# Patient Record
Sex: MALE | Race: WHITE | NOT HISPANIC OR LATINO | Employment: FULL TIME | URBAN - NONMETROPOLITAN AREA
[De-identification: names, ages, dates, MRNs, and addresses within clinical notes are randomized per-mention and may not be internally consistent; named-entity substitution may affect disease eponyms.]

---

## 2017-02-10 ENCOUNTER — OFFICE VISIT (OUTPATIENT)
Dept: FAMILY MEDICINE | Facility: OTHER | Age: 32
End: 2017-02-10
Attending: FAMILY MEDICINE
Payer: COMMERCIAL

## 2017-02-10 VITALS
HEIGHT: 71 IN | BODY MASS INDEX: 24.5 KG/M2 | HEART RATE: 80 BPM | DIASTOLIC BLOOD PRESSURE: 72 MMHG | WEIGHT: 175 LBS | TEMPERATURE: 97.2 F | OXYGEN SATURATION: 95 % | RESPIRATION RATE: 17 BRPM | SYSTOLIC BLOOD PRESSURE: 122 MMHG

## 2017-02-10 DIAGNOSIS — F17.229 CHEWING TOBACCO NICOTINE DEPENDENCE WITH NICOTINE-INDUCED DISORDER: ICD-10-CM

## 2017-02-10 DIAGNOSIS — J02.0 STREPTOCOCCAL SORE THROAT: Primary | ICD-10-CM

## 2017-02-10 LAB
BASOPHILS # BLD AUTO: 0 10E9/L (ref 0–0.2)
BASOPHILS NFR BLD AUTO: 0.1 %
DIFFERENTIAL METHOD BLD: ABNORMAL
EOSINOPHIL # BLD AUTO: 0.1 10E9/L (ref 0–0.7)
EOSINOPHIL NFR BLD AUTO: 0.9 %
ERYTHROCYTE [DISTWIDTH] IN BLOOD BY AUTOMATED COUNT: 11.9 % (ref 10–15)
HCT VFR BLD AUTO: 42.5 % (ref 40–53)
HGB BLD-MCNC: 14.7 G/DL (ref 13.3–17.7)
IMM GRANULOCYTES # BLD: 0.1 10E9/L (ref 0–0.4)
IMM GRANULOCYTES NFR BLD: 0.5 %
LYMPHOCYTES # BLD AUTO: 2.2 10E9/L (ref 0.8–5.3)
LYMPHOCYTES NFR BLD AUTO: 14.8 %
MCH RBC QN AUTO: 27.9 PG (ref 26.5–33)
MCHC RBC AUTO-ENTMCNC: 34.6 G/DL (ref 31.5–36.5)
MCV RBC AUTO: 81 FL (ref 78–100)
MONOCYTES # BLD AUTO: 0.8 10E9/L (ref 0–1.3)
MONOCYTES NFR BLD AUTO: 5.5 %
NEUTROPHILS # BLD AUTO: 11.7 10E9/L (ref 1.6–8.3)
NEUTROPHILS NFR BLD AUTO: 78.2 %
NRBC # BLD AUTO: 0 10*3/UL
NRBC BLD AUTO-RTO: 0 /100
PLATELET # BLD AUTO: 286 10E9/L (ref 150–450)
RBC # BLD AUTO: 5.26 10E12/L (ref 4.4–5.9)
WBC # BLD AUTO: 15 10E9/L (ref 4–11)

## 2017-02-10 PROCEDURE — 36415 COLL VENOUS BLD VENIPUNCTURE: CPT | Performed by: FAMILY MEDICINE

## 2017-02-10 PROCEDURE — 85025 COMPLETE CBC W/AUTO DIFF WBC: CPT | Performed by: FAMILY MEDICINE

## 2017-02-10 PROCEDURE — 99202 OFFICE O/P NEW SF 15 MIN: CPT | Performed by: FAMILY MEDICINE

## 2017-02-10 RX ORDER — AMOXICILLIN 875 MG
875 TABLET ORAL 2 TIMES DAILY
Qty: 20 TABLET | Refills: 0 | Status: SHIPPED | OUTPATIENT
Start: 2017-02-10 | End: 2019-10-13

## 2017-02-10 ASSESSMENT — PAIN SCALES - GENERAL: PAINLEVEL: SEVERE PAIN (7)

## 2017-02-10 NOTE — MR AVS SNAPSHOT
"              After Visit Summary   2/10/2017    Ej Gustafson    MRN: [id number]           Patient Information     Date Of Birth          1985        Visit Information        Provider Department      2/10/2017 2:45 PM Mandy Carvalho MD [facility]        Today's Diagnoses     Streptococcal sore throat    -  1     Chewing tobacco nicotine dependence with nicotine-induced disorder           Care Instructions    Rest, fluids, and decongestants        Follow-ups after your visit        Who to contact     If you have questions or need follow up information about today's clinic visit or your schedule please contact [facility] directly at [phone].  Normal or non-critical lab and imaging results will be communicated to you by [employer], letter or phone within 4 business days after the clinic has received the results. If you do not hear from us within 7 days, please contact the clinic through [employer] or phone. If you have a critical or abnormal lab result, we will notify you by phone as soon as possible.  Submit refill requests through [employer] or call your pharmacy and they will forward the refill request to us. Please allow 3 business days for your refill to be completed.          Additional Information About Your Visit        MyChart Information     [employer] lets you send messages to your doctor, view your test results, renew your prescriptions, schedule appointments and more. To sign up, go to [email or web address] . Click on \"Log in\" on the left side of the screen, which will take you to the Welcome page. Then click on \"Sign up Now\" on the right side of the page.     You will be asked to enter the access code listed below, as well as some personal information. Please follow the directions to create your username and password.     Your access code is: [email or web address]  Expires: 2017  3:04 PM     Your access code will  in 90 days. If you need help or a new code, please call " "your [place] clinic or [phone].        Care EveryWhere ID     This is your Care EveryWhere ID. This could be used by other organizations to access your [place] medical records  [id number]        Your Vitals Were     Pulse Temperature Respirations Height BMI (Body Mass Index) Pulse Oximetry    80 97.2  F (36.2  C) 17 5' 10.5\" (1.791 m) 24.75 kg/m2 95%       Blood Pressure from Last 3 Encounters:   02/10/17 122/72    Weight from Last 3 Encounters:   02/10/17 175 lb (79.379 kg)              We Performed the Following     CBC with platelets and differential          Today's Medication Changes          These changes are accurate as of: 2/10/17  3:04 PM.  If you have any questions, ask your nurse or doctor.               Start taking these medicines.        Dose/Directions    amoxicillin 875 MG tablet   Commonly known as:  AMOXIL   Used for:  Streptococcal sore throat   Started by:  Mandy Carvalho MD        Dose:  875 mg   Take 1 tablet (875 mg) by mouth 2 times daily   Quantity:  20 tablet   Refills:  0            Where to get your medicines      These medications were sent to [facility] [place] - [address]  [address] [place] [address]     Phone:  [phone]    - amoxicillin 875 MG tablet             Primary Care Provider    None Specified       No primary provider on file.        Thank you!     Thank you for choosing [facility]  for your care. Our goal is always to provide you with excellent care. Hearing back from our patients is one way we can continue to improve our services. Please take a few minutes to complete the written survey that you may receive in the mail after your visit with us. Thank you!             Your Updated Medication List - Protect others around you: Learn how to safely use, store and throw away your medicines at www.disposemymeds.org.          This list is accurate as of: 2/10/17  3:04 PM.  Always use your most recent med list.       "             Brand Name Dispense Instructions for use    amoxicillin 875 MG tablet    AMOXIL    20 tablet    Take 1 tablet (875 mg) by mouth 2 times daily

## 2017-02-10 NOTE — NURSING NOTE
"Chief Complaint   Patient presents with     Pharyngitis       Initial /72 mmHg  Pulse 80  Temp(Src) 97.2  F (36.2  C)  Resp 17  Ht 5' 10.5\" (1.791 m)  Wt 175 lb (79.379 kg)  BMI 24.75 kg/m2  SpO2 95% Estimated body mass index is 24.75 kg/(m^2) as calculated from the following:    Height as of this encounter: 5' 10.5\" (1.791 m).    Weight as of this encounter: 175 lb (79.379 kg).  Medication Reconciliation: complete  "

## 2017-02-10 NOTE — PROGRESS NOTES
"  SUBJECTIVE:                                                    Ej Gustafson is a 31 year old male who presents to clinic today for the following health issues:      RESPIRATORY SYMPTOMS      Duration: Monday     Description  rhinorrhea, sore throat, cough, fatigue/malaise and myalgias    Severity: mild    Accompanying signs and symptoms: None    History (predisposing factors):  none    Precipitating or alleviating factors: None    Therapies tried and outcome:  dayquil and vitamin c     [facility]    Problem list and histories reviewed & adjusted, as indicated.  Additional history: as documented    Current Outpatient Prescriptions   Medication Sig Dispense Refill     amoxicillin (AMOXIL) 875 MG tablet Take 1 tablet (875 mg) by mouth 2 times daily 20 tablet 0     Problem list, Medication list, Allergies, and Medical/Social/Surgical histories reviewed in EPIC and updated as appropriate.    ROS:  Constitutional, HEENT, cardiovascular, pulmonary, gi and gu systems are negative, except as otherwise noted.    OBJECTIVE:                                                    /72 mmHg  Pulse 80  Temp(Src) 97.2  F (36.2  C)  Resp 17  Ht 5' 10.5\" (1.791 m)  Wt 175 lb (79.379 kg)  BMI 24.75 kg/m2  SpO2 95%  Body mass index is 24.75 kg/(m^2).  GENERAL APPEARANCE: healthy, alert, no distress and fatigued  HENT: ear canals and TM's normal, nose and mouth without ulcers or lesions and tonsillar erythema  NECK: no asymmetry, masses, or scars, thyroid normal to palpation and cervical adenopathy   RESP: lungs clear to auscultation - no rales, rhonchi or wheezes  CV: regular rates and rhythm, normal S1 S2, no S3 or S4 and no murmur, click or rub  PSYCH: mentation appears normal and affect normal/bright       ASSESSMENT/PLAN:                                                    1. Streptococcal sore throat  Discussed probiotics  - CBC with platelets and differential  - amoxicillin (AMOXIL) 875 MG tablet; " Take 1 tablet (875 mg) by mouth 2 times daily  Dispense: 20 tablet; Refill: 0    2. Chewing tobacco nicotine dependence with nicotine-induced disorder  Recommend cessation, he is actively working on it    Patient was agreeable to this plan and had no further questions.  See Patient Instructions    Mandy Carvalho MD  [facility]

## 2019-01-14 ENCOUNTER — HOSPITAL ENCOUNTER (OUTPATIENT)
Dept: PHYSICAL THERAPY | Facility: HOSPITAL | Age: 34
Setting detail: THERAPIES SERIES
Discharge: HOME | End: 2019-01-14
Payer: COMMERCIAL

## 2019-01-14 PROCEDURE — 97110 THERAPEUTIC EXERCISES: CPT | Mod: GP | Performed by: PHYSICAL THERAPIST

## 2019-01-14 PROCEDURE — 97162 PT EVAL MOD COMPLEX 30 MIN: CPT | Mod: GP | Performed by: PHYSICAL THERAPIST

## 2019-01-14 ASSESSMENT — ACTIVITIES OF DAILY LIVING (ADL)
HOS_ADL_ITEM_SCORE_TOTAL: 36
HOW_WOULD_YOU_RATE_YOUR_CURRENT_LEVEL_OF_FUNCTION_DURING_YOUR_USUAL_ACTIVITIES_OF_DAILY_LIVING_FROM_0_TO_100_WITH_100_BEING_YOUR_LEVEL_OF_FUNCTION_PRIOR_TO_YOUR_HIP_PROBLEM_AND_0_BEING_THE_INABILITY_TO_PERFORM_ANY_OF_YOUR_USUAL_DAILY_ACTIVITIES?: 90
GOING_UP_1_FLIGHT_OF_STAIRS: MODERATE DIFFICULTY
WALKING_15_MINUTES_OR_GREATER: MODERATE DIFFICULTY
GETTING_INTO_AND_OUT_OF_AN_AVERAGE_CAR: MODERATE DIFFICULTY
WALKING_UP_STEEP_HILLS: MODERATE DIFFICULTY
HOS_ADL_SCORE(%): 56.25
HOS_ADL_HIGHEST_POTENTIAL_SCORE: 64
HOS_ADL_COUNT: 16
TWISTING/PIVOTING_ON_INVOLVED_LEG: MODERATE DIFFICULTY
ROLLING_OVER_IN_BED: NO DIFFICULTY AT ALL
HEAVY_WORK: MODERATE DIFFICULTY
STEPPING_UP_AND_DOWN_CURBS: SLIGHT DIFFICULTY
WALKING_APPROXIMATELY_10_MINUTES: MODERATE DIFFICULTY
WALKING_DOWN_STEEP_HILLS: MODERATE DIFFICULTY
SITTING_FOR_15_MINUTES: MODERATE DIFFICULTY
DEEP_SQUATTING: MODERATE DIFFICULTY
LIGHT_TO_MODERATE_WORK: MODERATE DIFFICULTY
RECREATIONAL_ACTIVITIES: MODERATE DIFFICULTY
PUTTING_ON_SOCKS_AND_SHOES: MODERATE DIFFICULTY
WALKING_INITIALLY: MODERATE DIFFICULTY
STANDING_FOR_15_MINUTES: SLIGHT DIFFICULTY
GOING_DOWN_1_FLIGHT_OF_STAIRS: MODERATE DIFFICULTY

## 2019-01-15 NOTE — PROGRESS NOTES
" 01/14/19 0829   General Information   Type of Visit Initial OP Ortho PT Evaluation   Start of Care Date 01/14/19   Referring Physician Alvaro Angeles   Patient/Family Goals Statement decrease hip pain and avoid surgery   Orders Evaluate and Treat   Date of Order 11/28/18   Insurance Type Other   Ins[facility] Comments/[facility]its [facility]   Medical Diagnosis pain in left hip   Surgical/Medical history reviewed Other (see commments)  (reviewed verbally through pt, PMH not available in chart)   Body Part(s)   Body Part(s) Hip   Presentation and Etiology   Pertinent history of current problem (include personal factors and/or comorbidities that impact the POC) Pt states symptoms started approximately 1 year.  Pt states his left hip catches and locks, states he has to pop it to get it back to feeling \"normal\".  Pt states if he's sitting he can wiggle his leg back and forth and hear/feel a cracking sounds as well as if he is on his side and lifts his leg up.  Pt does not recall any specific injury to his hip.  Pt states he has had an x-ray and was told he had in impingement.  Pt states the pain doesn't necessarily wake him up at night.  Pt states he is a  at [employer] and that is bothersome, also states when he was running equipment and sitting for prolonged periods he had increased pain.  Pt also noted that this fall when he was bird hunting and on uneven surfaces he was having increased pain.  Pt states he is able to manage all activities and deals with the pain.  Pt was in [employer] until December 2009.  Pt states he has pain with ascending stairs, also noted when lifting leg to step over objects he develops increased pain.   Impairments A. Pain;D. Decreased ROM;F. Decreased strength and endurance;E. Decreased flexibility;H. Impaired gait;M. Locking or catching   Functional Limitations perform activities of daily living;perform required work activities;perform desired leisure / " sports activities   Symptom Location left hip   How/Where did it occur From insidious onset   Onset date of current episode/exacerbation (reports it started about 1 year ago)   Chronicity New   Pain rating (0-10 point scale) Best (/10);Worst (/10)   Best (/10) 1   Worst (/10) 6   Pain quality A. Sharp;C. Aching   Frequency of pain/symptoms A. Constant   Pain/symptoms are: Worse in the morning   Pain/symptoms exacerbated by A. Sitting;B. Walking;G. Certain positions;I. Bending;J. ADL;K. Home tasks;L. Work tasks   Pain/symptoms eased by K. Other;E. Changing positions;C. Rest   Pain eased by comment stretching   Progression of symptoms since onset: Unchanged   Prior Level of Function   Prior Level of Function-Mobility indpendent   Prior Level of Function-ADLs independent   Current Level of Function   Patient role/employment history A. Employed   Employment Comments    Living environment House/townhome   Home/community accessibility stairs in his home   Fall Risk Screen   Fall screen completed by PT   Have you fallen 2 or more times in the past year? No   Have you fallen and had an injury in the past year? No   Is patient a fall risk? No   Hip Objective Findings   Scour Test positive with noted going from hip ER to hip IR with pressure   NEFTALI Test positive   FADIR Test positive   Left Hip Flexion PROM WNL   Left Hip Abduction PROM tightness present bilaterally   Left Hip ER PROM 20   Left Hip IR PROM 18 with pain reported   Left Hip Flexion Strength 4+/5 with pain   Left Hip Abduction Strength 4/5 with mild pain   Left Knee Flexion Strength 4/5   Left Knee Extension Strength 5/5   Senthil Flexibility Test tightness present left hip flexor   Left Hamstring Flexibility WNL   Left Piriformis Flexibility tightness present   Side (if bilateral, select both right and left) Left   Observation no acute distress   Posture unremarkable   Balance/Proprioception (Single Leg Stance) WNL   Hip ROM Comments Pt has  increased tightness in left hip compared to right   Palpation Tenderness present in anterior hip region   Planned Therapy Interventions   Planned Therapy Interventions gait training;joint mobilization;manual therapy;neuromuscular re-education;ROM;strengthening;stretching   Clinical Impression   Criteria for Skilled Therapeutic Interventions Met yes, treatment indicated   PT Diagnosis left hip pain consistent with impingement   Influenced by the following impairments pain, decreased ROM, decreased strength   Functional limitations due to impairments decreased tolerance for work duties, decreased tolerance for prolonged sitting, decreased tolerance for walking on uneven surfaces   Clinical Presentation Evolving/Changing   Clinical Presentation Rationale clinical judgement   Clinical Decision Making (Complexity) Moderate complexity   Therapy Frequency other (see comments)  (1-2x/week prn)   Predicted Duration of Therapy Intervention (days/wks) 10 weeks   Risk & Benefits of therapy have been explained Yes   Patient, Family & other staff in agreement with plan of care Yes   Clinical Impression Comments Pt presents with left hip pain that started approximately 1 year ago from insidious onset.  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  Pt states he will get a catching/locking sensation with certain positions.  Pt has pain after prolonged sitting.  Pt has not been evaluated by orthopedics.  Pt's pain appears consistent with some form of impingement.  Pt would benefit from further evaluation by orthopedics and ongoing PT for stretching and strengthening.   Education Assessment   Preferred Learning Style Listening;Reading   Barriers to Learning No barriers   ORTHO GOALS   PT Ortho Eval Goals 1;2;3   Ortho Goal 1   Goal Identifier STG 1   Goal Description Pt to be independent and compliant with HEP.   Target Date 01/28/19   Ortho Goal 2   Goal  Identifier LTG 1   Goal Description Pt to tolerate car ride >90 minutes without increased pain upon rising from car.   Target Date 03/25/19   Ortho Goal 3   Goal Identifier LTG 2   Goal Description Pt will report at least a 50% decrease in overall pain and episodes of locking/catching to improve overall quality of life.   Target Date 03/25/19   Total Evaluation Time   PT Eval, Moderate Complexity Minutes (39993) 27

## 2019-04-27 NOTE — PROGRESS NOTES
Outpatient Physical Therapy Discharge Note     Patient: Ej Gustafson  : 1985    Beginning/End Dates of Reporting Period:  2019 to 2019    Referring Provider: Alvaro Gonzales Diagnosis: left hip pain consistent with impingement     Client Self Report: See PT eval    Objective Measurements:        Outcome Measures (most recent score):      Goals:  Goal Identifier STG 1   Goal Description Pt to be independent and compliant with HEP.   Target Date 19   Date Met      Progress:     Goal Identifier LTG 1   Goal Description Pt to tolerate car ride >90 minutes without increased pain upon rising from car.   Target Date 19   Date Met      Progress:     Goal Identifier LTG 2   Goal Description Pt will report at least a 50% decrease in overall pain and episodes of locking/catching to improve overall quality of life.   Target Date 19   Date Met      Progress:       Progress Toward Goals:   Not assessed:  Plan was for patient to follow back up after orthopedic assessment however pt failed to schedule additional treatment sessions          Plan:  Discharge from therapy.    Discharge:    Reason for Discharge: Patient has failed to schedule further appointments.    Equipment Issued:     Discharge Plan: Other services: pt was to follow up with orthopedics.

## 2019-10-13 ENCOUNTER — HOSPITAL ENCOUNTER (EMERGENCY)
Facility: HOSPITAL | Age: 34
Discharge: HOME | End: 2019-10-13
Attending: NURSE PRACTITIONER | Admitting: NURSE PRACTITIONER
Payer: COMMERCIAL

## 2019-10-13 VITALS
TEMPERATURE: 97.8 F | HEART RATE: 75 BPM | SYSTOLIC BLOOD PRESSURE: 128 MMHG | OXYGEN SATURATION: 96 % | DIASTOLIC BLOOD PRESSURE: 64 MMHG | RESPIRATION RATE: 18 BRPM

## 2019-10-13 DIAGNOSIS — L25.9 CONTACT DERMATITIS, UNSPECIFIED CONTACT DERMATITIS TYPE, UNSPECIFIED TRIGGER: Primary | ICD-10-CM

## 2019-10-13 PROCEDURE — 99213 OFFICE O/P EST LOW 20 MIN: CPT | Mod: Z6 | Performed by: NURSE PRACTITIONER

## 2019-10-13 PROCEDURE — G0463 HOSPITAL OUTPT CLINIC VISIT: HCPCS

## 2019-10-13 RX ORDER — TRIAMCINOLONE ACETONIDE 5 MG/G
CREAM TOPICAL 2 TIMES DAILY
Qty: 15 G | Refills: 1 | Status: SHIPPED | OUTPATIENT
Start: 2019-10-13 | End: 2019-12-31

## 2019-10-13 RX ORDER — METHYLPREDNISOLONE 4 MG
TABLET, DOSE PACK ORAL
Qty: 21 TABLET | Refills: 0 | Status: SHIPPED | OUTPATIENT
Start: 2019-10-13 | End: 2019-12-31

## 2019-10-13 ASSESSMENT — ENCOUNTER SYMPTOMS
FATIGUE: 0
ACTIVITY CHANGE: 0
MYALGIAS: 0
APPETITE CHANGE: 0
ARTHRALGIAS: 0
COUGH: 0
FEVER: 0
SORE THROAT: 0
CHILLS: 0
RHINORRHEA: 0

## 2019-10-13 NOTE — ED AVS SNAPSHOT
[facility]  [address] [address]  Phone:  [phone]                                    Ej Gustafson   MRN: [id number]    Department:  [facility]   Date of Visit:  10/13/2019           After Visit Summary Signature Page    I have received my discharge instructions, and my questions have been answered. I have discussed any challenges I see with this plan with the nurse or doctor.    ..........................................................................................................................................  Patient/Patient Representative Signature      ..........................................................................................................................................  Patient Representative Print Name and Relationship to Patient    ..................................................               ................................................  Date                                   Time    ..........................................................................................................................................  Reviewed by Signature/Title    ...................................................              ..............................................  Date                                               Time          22EPIC Rev 08/18

## 2019-10-13 NOTE — DISCHARGE INSTRUCTIONS
(L25.9) Contact dermatitis, unspecified contact dermatitis type, unspecified trigger  (primary encounter diagnosis)  Comment: Acute, symptomatic  Plan: Suspecting possible poison ivy.   Recommend washing clothing, bedding  Start medrol dose pack as directed  You can apply triamcinolone topically as directed    If no improvement or worsening symptoms return for further evaluation.     Sherley Pittman, CNP

## 2019-10-13 NOTE — ED PROVIDER NOTES
"  History     Chief Complaint   Patient presents with     Rash     \" rash since Oct 3\"     HPI  Ej Gustafson is a 34 year old male who presents ambulatory with concerns of rash. Started around October 3, 2019. He is uncertain of possible cause. Denies fever, chills, otalgia, pharyngitis, rhinorrhea, coughing. Denies any new exposures. The rash is itchy - he has been using cortisone cream with minimal relief. Rash started on right arm and he now has some areas on RLE, LLE, and LUE. Rash does not burn or feel painful. Prior to onset of rash he had spent a lot of time out in the woods bird hunting - he was wearing sleeves but he was wondering if he could have possibly gotten poison ivy from petting his dog who was also out in the woods with him. No history of similar rash. Denies known tick bite.    Allergies:  No Known Allergies    Problem List:    Patient Active Problem List    Diagnosis Date Noted     Chewing tobacco nicotine dependence with nicotine-induced disorder 02/10/2017     Priority: Medium        Past Medical History:    Past Medical History:   Diagnosis Date     Tobacco abuse        Past Surgical History:    Past Surgical History:   Procedure Laterality Date     NO HISTORY OF SURGERY         Family History:    Family History   Problem Relation Age of Onset     Family History Negative Mother      Family History Negative Sister      Family History Negative Brother        Social History:  Marital Status:   [2]  Social History     Tobacco Use     Smoking status: Light Tobacco Smoker     Years: 10.00     Types: Dip, chew, snus or snuff     Smokeless tobacco: Current User     Types: Chew     Tobacco comment: 2 cans/week   Substance Use Topics     Alcohol use: Yes     Alcohol/week: 0.0 standard drinks     Comment: 1 weekend/month     Drug use: No        Medications:    methylPREDNISolone (MEDROL DOSEPAK) 4 MG tablet therapy pack  triamcinolone (ARISTOCORT HP) 0.5 % external cream          Review of Systems "   Constitutional: Negative for activity change, appetite change, chills, fatigue and fever.   HENT: Negative for congestion, ear pain, rhinorrhea and sore throat.    Respiratory: Negative for cough.    Musculoskeletal: Negative for arthralgias and myalgias.   Skin: Positive for rash.       Physical Exam   BP: 128/64  Pulse: 75  Temp: 97.8  F (36.6  C)  Resp: 18  SpO2: 96 %      Physical Exam  Constitutional:       General: He is not in acute distress.     Appearance: Normal appearance. He is not ill-appearing, toxic-appearing or diaphoretic.   Skin:     General: Skin is warm and dry.      Capillary Refill: Capillary refill takes less than 2 seconds.      Findings: Rash present.             Comments: Right arm with 5.0 cm x 7.0 xm erythematous slightly raised, nontender to right bicep. Small 4.0 cm x 5.0 cm erythematous slightly raised area to right forearm.  Small quarter-sized area to left bicep  Small quarter-sized area to right lateral lower extremity - slightly flaky  Small nickel-sized area to left medial lower extremity- slightly flaky    No warmth, non tender, no active drainage, no pustules, no vesicles to any of these areas.    Neurological:      Mental Status: He is alert and oriented to person, place, and time.      Motor: No weakness.   Psychiatric:         Mood and Affect: Mood normal.         Speech: Speech normal.         Behavior: Behavior normal. Behavior is cooperative.         ED Course        Procedures         No results found for this or any previous visit (from the past 24 hour(s)).    Medications - No data to display    Assessments & Plan (with Medical Decision Making)     I have reviewed the nursing notes.    I have reviewed the findings, diagnosis, plan and need for follow up with the patient.  (L25.9) Contact dermatitis, unspecified contact dermatitis type, unspecified trigger  (primary encounter diagnosis)  Comment: Acute, symptomatic  Plan: He has a picture of the rash appearance at  onset on his cell phone that he showed me. It is erythematous with small vesicles similar to poison ivy. Today there are no vesicles. Discussed that areas on lower extremities may or may not be related as they do appear slightly different with flaking and erythematous border consistent with eczema. He denies history of eczema, psoriasis.   Recommend washing clothing, bedding  Start medrol dose pack as directed  You can apply triamcinolone topically as directed    If no improvement or worsening symptoms return for further evaluation.       Discharge Medication List as of 10/13/2019 12:40 PM      START taking these medications    Details   methylPREDNISolone (MEDROL DOSEPAK) 4 MG tablet therapy pack Follow Package Directions, Disp-21 tablet, R-0, E-Prescribe      triamcinolone (ARISTOCORT HP) 0.5 % external cream Apply topically 2 times daily for 7 daysDisp-15 g, [id number]             Final diagnoses:   Contact dermatitis, unspecified contact dermatitis type, unspecified trigger       Sherley Pittman CNP   10/13/2019   [facility]     Sherley Pittman CNP  10/13/19 7083

## 2019-10-13 NOTE — ED TRIAGE NOTES
Pt is here today with c/o rash that started behind the right knee on oct 3rd, and now it has started to left arm and behind the left knee. pt denies any new cream, lotions, medications, detergents. Has tried cortizone 10 cream with no relief, pt states its itchy. Was having some clear drainage after scratching.

## 2019-12-31 ENCOUNTER — APPOINTMENT (OUTPATIENT)
Dept: GENERAL RADIOLOGY | Facility: HOSPITAL | Age: 34
End: 2019-12-31
Attending: NURSE PRACTITIONER
Payer: COMMERCIAL

## 2019-12-31 ENCOUNTER — HOSPITAL ENCOUNTER (EMERGENCY)
Facility: HOSPITAL | Age: 34
Discharge: HOME | End: 2019-12-31
Attending: NURSE PRACTITIONER | Admitting: NURSE PRACTITIONER
Payer: COMMERCIAL

## 2019-12-31 VITALS
SYSTOLIC BLOOD PRESSURE: 130 MMHG | HEIGHT: 70 IN | TEMPERATURE: 101.8 F | WEIGHT: 180 LBS | OXYGEN SATURATION: 97 % | RESPIRATION RATE: 18 BRPM | BODY MASS INDEX: 25.77 KG/M2 | DIASTOLIC BLOOD PRESSURE: 49 MMHG

## 2019-12-31 DIAGNOSIS — J10.1 INFLUENZA B: Primary | ICD-10-CM

## 2019-12-31 DIAGNOSIS — F17.210 CIGARETTE SMOKER: ICD-10-CM

## 2019-12-31 LAB
DEPRECATED S PYO AG THROAT QL EIA: NORMAL
FLUAV+FLUBV RNA SPEC QL NAA+PROBE: NEGATIVE
FLUAV+FLUBV RNA SPEC QL NAA+PROBE: POSITIVE
RSV RNA SPEC NAA+PROBE: NEGATIVE
SPECIMEN SOURCE: ABNORMAL
SPECIMEN SOURCE: NORMAL

## 2019-12-31 PROCEDURE — G0463 HOSPITAL OUTPT CLINIC VISIT: HCPCS

## 2019-12-31 PROCEDURE — 87081 CULTURE SCREEN ONLY: CPT | Performed by: NURSE PRACTITIONER

## 2019-12-31 PROCEDURE — 87880 STREP A ASSAY W/OPTIC: CPT | Performed by: NURSE PRACTITIONER

## 2019-12-31 PROCEDURE — 87631 RESP VIRUS 3-5 TARGETS: CPT | Performed by: NURSE PRACTITIONER

## 2019-12-31 PROCEDURE — 71046 X-RAY EXAM CHEST 2 VIEWS: CPT | Mod: TC

## 2019-12-31 PROCEDURE — 99213 OFFICE O/P EST LOW 20 MIN: CPT | Mod: Z6 | Performed by: NURSE PRACTITIONER

## 2019-12-31 RX ORDER — OSELTAMIVIR PHOSPHATE 75 MG/1
75 CAPSULE ORAL 2 TIMES DAILY
Qty: 10 CAPSULE | Refills: 0 | Status: SHIPPED | OUTPATIENT
Start: 2019-12-31 | End: 2020-01-05

## 2019-12-31 ASSESSMENT — ENCOUNTER SYMPTOMS
MYALGIAS: 1
CHILLS: 1
FEVER: 1
VOMITING: 0
TROUBLE SWALLOWING: 0
SHORTNESS OF BREATH: 0
COUGH: 1
SORE THROAT: 1

## 2019-12-31 ASSESSMENT — MIFFLIN-ST. JEOR: SCORE: 1767.72

## 2019-12-31 NOTE — ED AVS SNAPSHOT
[facility]  [address] [address]  Phone:  [phone]                                    Ej Gustafson   MRN: [id number]    Department:  [facility]   Date of Visit:  12/31/2019           After Visit Summary Signature Page    I have received my discharge instructions, and my questions have been answered. I have discussed any challenges I see with this plan with the nurse or doctor.    ..........................................................................................................................................  Patient/Patient Representative Signature      ..........................................................................................................................................  Patient Representative Print Name and Relationship to Patient    ..................................................               ................................................  Date                                   Time    ..........................................................................................................................................  Reviewed by Signature/Title    ...................................................              ..............................................  Date                                               Time          22EPIC Rev 08/18

## 2019-12-31 NOTE — DISCHARGE INSTRUCTIONS
Drink plenty of fluids to stay hydrated.  Take Tylenol or ibuprofen as per directions as needed for fever.  Return to emergency department for worsening or concerning symptoms.    To establish care, call one of the clinics (of your choosing) and request an establish care appointment. These are longer, thorough appointments so there is sometimes a wait to get in for this appointment.   [facility] [phone] [address] [place] [address]  [facility] [phone]             [address] [place] [address]  [facility] [phone]            [address] [place] [address]    If you are a , you can call the [facility] [place]  about getting established at the [facility] @ [phone].   [facility] [phone]

## 2019-12-31 NOTE — ED TRIAGE NOTES
"Pt is here with c/o fever onset sunday and cough onset yesterday, 'hurts to swallow\". dayquil at 1045 today. Pt reports being exposed to 2 daughters that have been dx with the flu  "

## 2019-12-31 NOTE — ED PROVIDER NOTES
History     Chief Complaint   Patient presents with     Fever     HPI  Ej Gustafson is a 34 year old male who presents to  for fever. Onset 3 days ago. He reports temperatures of 103.1F at home. He reports sore throat, cough, chills, body aches.  He has been drinking a lot of fluids but not eating much. Feels slightly nauseous.  He has been taking dayquil and nyquil.He states that his daughters where both diagnosed with influenza B. He has not gotten a flu vaccination this season. Denies coughing anything up.     Allergies:  No Known Allergies    Problem List:    Patient Active Problem List    Diagnosis Date Noted     Chewing tobacco nicotine dependence with nicotine-induced disorder 02/10/2017     Priority: Medium        Past Medical History:    Past Medical History:   Diagnosis Date     Tobacco abuse        Past Surgical History:    Past Surgical History:   Procedure Laterality Date     NO HISTORY OF SURGERY         Family History:    Family History   Problem Relation Age of Onset     Family History Negative Mother      Family History Negative Sister      Family History Negative Brother        Social History:  Marital Status:   [2]  Social History     Tobacco Use     Smoking status: Light Tobacco Smoker     Years: 10.00     Types: Dip, chew, snus or snuff     Smokeless tobacco: Current User     Types: Chew     Tobacco comment: 2 cans/week   Substance Use Topics     Alcohol use: Yes     Alcohol/week: 0.0 standard drinks     Comment: 1 weekend/month     Drug use: No        Medications:    oseltamivir (TAMIFLU) 75 MG capsule          Review of Systems   Constitutional: Positive for chills and fever.   HENT: Positive for sore throat. Negative for trouble swallowing.    Respiratory: Positive for cough. Negative for shortness of breath.    Cardiovascular: Negative for chest pain.   Gastrointestinal: Negative for vomiting.   Musculoskeletal: Positive for myalgias.   All other systems reviewed and are  "negative.      Physical Exam   BP: (!) 130/49  Heart Rate: 85  Temp: (!) 101.8  F (38.8  C)  Resp: 18  Height: 177.8 cm (5' 10\")  Weight: 81.6 kg (180 lb)  SpO2: 97 %      Physical Exam  Vitals signs and nursing note reviewed.   Constitutional:       Appearance: He is ill-appearing. He is not toxic-appearing.   HENT:      Right Ear: Tympanic membrane and ear canal normal.      Left Ear: Tympanic membrane and ear canal normal.      Mouth/Throat:      Mouth: Mucous membranes are moist.   Eyes:      Pupils: Pupils are equal, round, and reactive to light.   Neck:      Musculoskeletal: Neck supple.   Cardiovascular:      Rate and Rhythm: Normal rate and regular rhythm.      Heart sounds: Normal heart sounds.   Pulmonary:      Effort: Pulmonary effort is normal.      Breath sounds: No wheezing, rhonchi or rales.   Abdominal:      General: Bowel sounds are normal.      Palpations: Abdomen is soft.      Tenderness: There is no abdominal tenderness. There is no guarding.   Skin:     General: Skin is warm and dry.   Neurological:      Mental Status: He is alert and oriented to person, place, and time.   Psychiatric:         Mood and Affect: Mood normal.         Thought Content: Thought content normal.         ED Course        Procedures         Results for orders placed or performed during the hospital encounter of 12/31/19 (from the past 24 hour(s))   Rapid strep screen   Result Value Ref Range    Specimen Description Throat     Rapid Strep A Screen       NEGATIVE: No Group A streptococcal antigen detected by immunoassay, await culture report.   Influenza A and B and RSV PCR   Result Value Ref Range    Specimen Description Nasal     Influenza A PCR Negative NEG^Negative    Influenza B PCR Positive (A) NEG^Negative    Resp Syncytial Virus Negative NEG^Negative   Chest XR,  PA & LAT    Narrative    PROCEDURE:  XR CHEST 2 VW    HISTORY:  cough, fever r/o pneumonia.     COMPARISON:  None.    FINDINGS:   The cardiac silhouette is " normal in size. The pulmonary vasculature is  normal.  The lungs are clear. No pleural effusion or pneumothorax.      Impression    IMPRESSION:  No acute cardiopulmonary disease.      YAO HUSAIN MD       Medications - No data to display    Assessments & Plan (with Medical Decision Making)   Influenza B:  Heart rate and rhythm regular.  No adventitious lung sounds auscultated bilaterally.  Vital signs stable.  Temperature on arrival is 101.8  F-patient states he took something for fever about 2 hours prior to arrival, with declines anything for fever during this visit.  Chest x-ray and negative for pneumonia.  Rapid strep negative, culture pending.  Positive for influenza B.  Plan:  Will prescribe Tamiflu.  Discussed symptomatic treatment of influenza with pushing fluids and taking Tylenol alternating with ibuprofen as needed for the fever and rest.  Patient requested information on setting up with a PCP, information provided by .  Return to emergency department for worsening or concerning symptoms.  Patient verbalized understanding.    I have reviewed the nursing notes.    I have reviewed the findings, diagnosis, plan and need for follow up with the patient.      New Prescriptions    OSELTAMIVIR (TAMIFLU) 75 MG CAPSULE    Take 1 capsule (75 mg) by mouth 2 times daily for 5 days       Final diagnoses:   Influenza B       12/31/2019   [facility]     Mpofu, Prudence, CNP  12/31/19 1328

## 2020-01-02 ENCOUNTER — TELEPHONE (OUTPATIENT)
Dept: FAMILY MEDICINE | Facility: OTHER | Age: 35
End: 2020-01-02

## 2020-01-02 LAB
BACTERIA SPEC CULT: NORMAL
SPECIMEN SOURCE: NORMAL

## 2020-01-02 NOTE — TELEPHONE ENCOUNTER
Pt's SO called, requesting f/u appt for influenza B. Pt was seen in UC on 12/31 and is not feeling any better. Did advise wife that because pt does not have a primary doctor here, unable to schedule him for appt. Advised wife to bring pt back to UC if he is not improving or feeling worse. Wife was transferred to scheduling to make establish care appt per her request.

## 2020-03-02 ENCOUNTER — HEALTH MAINTENANCE LETTER (OUTPATIENT)
Age: 35
End: 2020-03-02

## 2020-12-14 ENCOUNTER — HEALTH MAINTENANCE LETTER (OUTPATIENT)
Age: 35
End: 2020-12-14

## 2021-04-18 ENCOUNTER — HEALTH MAINTENANCE LETTER (OUTPATIENT)
Age: 36
End: 2021-04-18

## 2021-08-06 ENCOUNTER — HOSPITAL ENCOUNTER (EMERGENCY)
Facility: HOSPITAL | Age: 36
Discharge: HOME | End: 2021-08-06
Attending: NURSE PRACTITIONER | Admitting: NURSE PRACTITIONER
Payer: COMMERCIAL

## 2021-08-06 ENCOUNTER — APPOINTMENT (OUTPATIENT)
Dept: GENERAL RADIOLOGY | Facility: HOSPITAL | Age: 36
End: 2021-08-06
Attending: NURSE PRACTITIONER
Payer: COMMERCIAL

## 2021-08-06 VITALS
OXYGEN SATURATION: 100 % | TEMPERATURE: 97.7 F | RESPIRATION RATE: 16 BRPM | DIASTOLIC BLOOD PRESSURE: 93 MMHG | SYSTOLIC BLOOD PRESSURE: 144 MMHG | HEART RATE: 61 BPM

## 2021-08-06 DIAGNOSIS — S92.403B: Primary | ICD-10-CM

## 2021-08-06 PROCEDURE — 73660 X-RAY EXAM OF TOE(S): CPT | Mod: RT

## 2021-08-06 PROCEDURE — 250N000011 HC RX IP 250 OP 636: Performed by: NURSE PRACTITIONER

## 2021-08-06 PROCEDURE — 90471 IMMUNIZATION ADMIN: CPT | Performed by: NURSE PRACTITIONER

## 2021-08-06 PROCEDURE — 999N000104 HC STATISTIC NO CHARGE

## 2021-08-06 PROCEDURE — 12001 RPR S/N/AX/GEN/TRNK 2.5CM/<: CPT

## 2021-08-06 PROCEDURE — 12001 RPR S/N/AX/GEN/TRNK 2.5CM/<: CPT | Performed by: NURSE PRACTITIONER

## 2021-08-06 PROCEDURE — 90715 TDAP VACCINE 7 YRS/> IM: CPT | Performed by: NURSE PRACTITIONER

## 2021-08-06 RX ORDER — OXYCODONE AND ACETAMINOPHEN 5; 325 MG/1; MG/1
1 TABLET ORAL EVERY 4 HOURS PRN
Qty: 12 TABLET | Refills: 0 | Status: SHIPPED | OUTPATIENT
Start: 2021-08-06 | End: 2021-08-09

## 2021-08-06 RX ORDER — CEPHALEXIN 500 MG/1
500 CAPSULE ORAL 3 TIMES DAILY
Qty: 21 CAPSULE | Refills: 0 | Status: SHIPPED | OUTPATIENT
Start: 2021-08-06 | End: 2021-08-13

## 2021-08-06 RX ADMIN — CLOSTRIDIUM TETANI TOXOID ANTIGEN (FORMALDEHYDE INACTIVATED), CORYNEBACTERIUM DIPHTHERIAE TOXOID ANTIGEN (FORMALDEHYDE INACTIVATED), BORDETELLA PERTUSSIS TOXOID ANTIGEN (GLUTARALDEHYDE INACTIVATED), BORDETELLA PERTUSSIS FILAMENTOUS HEMAGGLUTININ ANTIGEN (FORMALDEHYDE INACTIVATED), BORDETELLA PERTUSSIS PERTACTIN ANTIGEN, AND BORDETELLA PERTUSSIS FIMBRIAE 2/3 ANTIGEN 0.5 ML: 5; 2; 2.5; 5; 3; 5 INJECTION, SUSPENSION INTRAMUSCULAR at 18:42

## 2021-08-06 NOTE — Clinical Note
Ej Gustafson was seen and treated in our emergency department on 8/6/2021.  He may return to work on 08/10/2021.       If you have any questions or concerns, please don't hesitate to call.      Mpofu, Prudence, CNP

## 2021-08-07 NOTE — DISCHARGE INSTRUCTIONS
Take antibiotic as prescribed until finished.  Take Tylenol ibuprofen as needed for pain.  Take the Percocet for severe pain.    Schedule an appointment with podiatry for further evaluation.    Return to emergency room if any concerning symptoms.

## 2021-08-08 ASSESSMENT — ENCOUNTER SYMPTOMS: WOUND: 1

## 2021-08-08 NOTE — ED PROVIDER NOTES
History     Chief Complaint   Patient presents with     Toe Injury     right toe     HPI  Ej Gustafson is a 36 year old male who presents to urgent care for concerns of right toe injury.  Patient tells me he was moving a dresser when he accidentally dropped it on top of it right great toe.  He notes that he split his right great toe open prompting his visit today.  He notes he is oozing blood to the right great toe.  No blood thinner use.  Last Tdap unknown.    Allergies:  No Known Allergies    Problem List:    Patient Active Problem List    Diagnosis Date Noted     Chewing tobacco nicotine dependence with nicotine-induced disorder 02/10/2017     Priority: Medium        Past Medical History:    Past Medical History:   Diagnosis Date     Tobacco abuse        Past Surgical History:    Past Surgical History:   Procedure Laterality Date     NO HISTORY OF SURGERY         Family History:    Family History   Problem Relation Age of Onset     Family History Negative Mother      Family History Negative Sister      Family History Negative Brother        Social History:  Marital Status:   [2]  Social History     Tobacco Use     Smoking status: Light Tobacco Smoker     Years: 10.00     Types: Dip, chew, snus or snuff     Smokeless tobacco: Current User     Types: Chew     Tobacco comment: 2 cans/week   Substance Use Topics     Alcohol use: Yes     Alcohol/week: 0.0 standard drinks     Comment: 1 weekend/month     Drug use: No        Medications:    cephALEXin (KEFLEX) 500 MG capsule  oxyCODONE-acetaminophen (PERCOCET) 5-325 MG tablet          Review of Systems   Skin: Positive for wound.   All other systems reviewed and are negative.      Physical Exam   BP: 144/93  Pulse: 61  Temp: 97.7  F (36.5  C)  Resp: 16  SpO2: 100 %      Physical Exam  Vitals and nursing note reviewed.   Constitutional:       Appearance: Normal appearance. He is not ill-appearing or toxic-appearing.   HENT:      Head: Normocephalic.   Eyes:       Pupils: Pupils are equal, round, and reactive to light.   Cardiovascular:      Rate and Rhythm: Normal rate.      Pulses:           Dorsalis pedis pulses are 2+ on the right side.   Pulmonary:      Effort: Pulmonary effort is normal.   Musculoskeletal:      Cervical back: Neck supple.        Feet:    Feet:      Comments: 2 cm laceration to right great toe, close to the right great toenail.  No obvious deformity to right great toe.  Right pedal pulse 2+.  Cap refill less than 2 seconds.  Skin:     General: Skin is warm and dry.      Capillary Refill: Capillary refill takes less than 2 seconds.      Findings: No erythema.   Neurological:      Mental Status: He is alert and oriented to person, place, and time.         ED Course        [facility]    -Laceration Repair    Date/Time: 8/6/2021 7:30 PM  Performed by: Leia Lewis CNP  Authorized by: Leia Lewis CNP       ANESTHESIA (see MAR for exact dosages):     Anesthesia method:  Nerve block    Block needle gauge:  25 G    Block anesthetic:  Lidocaine 1% w/o epi    Block injection procedure:  Anatomic landmarks identified, anatomic landmarks palpated, introduced needle, negative aspiration for blood and incremental injection    Block outcome:  Anesthesia achieved      LACERATION DETAILS     Location:  Toe    Toe location:  R big toe    Length (cm):  2    REPAIR TYPE:     Repair type:  Intermediate      EXPLORATION:     Hemostasis achieved with:  Direct pressure    Wound exploration: wound explored through full range of motion and entire depth of wound probed and visualized      Wound extent: underlying fracture and vascular damage      Wound extent: no foreign body and no nerve damage      Contaminated: no      TREATMENT:     Area cleansed with:  Hibiclens and saline    Amount of cleaning:  Standard    Irrigation solution:  Sterile saline    Visualized foreign bodies/material removed: no      SKIN REPAIR     Repair method:  Sutures    Suture size:   3-0    APPROXIMATION     Approximation:  Loose    POST-PROCEDURE DETAILS     Dressing:  Bulky dressing      PROCEDURE   Patient Tolerance:  Patient tolerated the procedure well with no immediate complications                    XR Toe Right G/E 2 Views  Order: [id number]  Status:  Final result   Visible to patient:  Yes (MyChart) Next appt:  None   0 Result Notes  Details    Reading Physician Reading Date Result Priority   Jorge Rose MD  [phone] 8/6/2021       Narrative & Impression  Exam: XR TOE RIGHT G/E 2 VIEWS     Technique: Right first toe, 2 views     Comparison: None.     Exam reason: dresser fell on it.     Findings:  There is a comminuted fracture of the tuft of the first distal  phalanx, with mild displacement of the largest fracture fragment. No  other fracture or desiccation. Joint spaces are preserved.                                                                      Impression:  Comminuted fracture of the tuft of the first distal phalanx.     JORGE ROSE MD         SYSTEM ID:  [id number]         Last Resulted: 08/06/21  6:22 PM               Medications   Tdap (tetanus-diphtheria-acell pertussis) (ADACEL) injection 0.5 mL (0.5 mLs Intramuscular Given 8/6/21 1842)       Assessments & Plan (with Medical Decision Making)     I have reviewed the nursing notes.    36-year-old male that accidentally dropped a dresser to his right great toe and presented with a laceration to his right great toe.  X-rays reveal a comminuted fracture with displacement to one of the fragments.  Wound was cleaned extensively.  A loose sutures applied along with a bulky dressing.  Ortho shoe given.  Tdap updated today. Cephalexin as prescribed for open fracture.  Recommended APAP/ibuprofen as needed for pain.  Short prescription of Percocet for severe pain.  Referral was placed to podiatry for further evaluation.  Return to ED/UC for any concerning symptoms.  Patient verbalized understanding.    I have reviewed  the findings, diagnosis, plan and need for follow up with the patient.  This document was prepared using a combination of typing and voice generated software.  While every attempt was made for accuracy, spelling and grammatical errors may exist.    Discharge Medication List as of 8/6/2021  7:39 PM      START taking these medications    Details   cephALEXin (KEFLEX) 500 MG capsule Take 1 capsule (500 mg) by mouth 3 times daily for 7 days, Disp-21 capsule, R-0, E-Prescribe      oxyCODONE-acetaminophen (PERCOCET) 5-325 MG tablet Take 1 tablet by mouth every 4 hours as needed for pain, Disp-12 tablet, R-0, E-Prescribe             Final diagnoses:   Open fracture of great toe       8/6/2021   [facility]     Mpofu, Prudence, CNP  08/08/21 1140

## 2021-08-12 ENCOUNTER — OFFICE VISIT (OUTPATIENT)
Dept: PODIATRY | Facility: OTHER | Age: 36
End: 2021-08-12
Attending: PODIATRIST
Payer: COMMERCIAL

## 2021-08-12 VITALS
SYSTOLIC BLOOD PRESSURE: 133 MMHG | RESPIRATION RATE: 16 BRPM | OXYGEN SATURATION: 98 % | HEART RATE: 72 BPM | DIASTOLIC BLOOD PRESSURE: 74 MMHG | TEMPERATURE: 96.7 F

## 2021-08-12 DIAGNOSIS — S92.421B OPEN DISPLACED FRACTURE OF DISTAL PHALANX OF RIGHT GREAT TOE, INITIAL ENCOUNTER: ICD-10-CM

## 2021-08-12 DIAGNOSIS — M79.671 RIGHT FOOT PAIN: ICD-10-CM

## 2021-08-12 DIAGNOSIS — S91.219A LACERATION OF NAIL BED OF TOE, INITIAL ENCOUNTER: Primary | ICD-10-CM

## 2021-08-12 PROCEDURE — 11760 REPAIR OF NAIL BED: CPT | Performed by: PODIATRIST

## 2021-08-12 RX ORDER — CEPHALEXIN 500 MG/1
500 CAPSULE ORAL 3 TIMES DAILY
Qty: 21 CAPSULE | Refills: 0 | Status: SHIPPED | OUTPATIENT
Start: 2021-08-12 | End: 2021-08-19

## 2021-08-12 ASSESSMENT — PAIN SCALES - GENERAL: PAINLEVEL: NO PAIN (1)

## 2021-08-12 NOTE — PATIENT INSTRUCTIONS
Thank you for allowing  and our Podiatry team to participate in your care. Please call our office at [phone] with scheduling questions or with any other questions or concerns.

## 2021-08-12 NOTE — LETTER
August 12, 2021      Ej Gustafson  [address] [place] [address] [place] [address]        To Whom It May Concern:    Ej Gustafson  was seen on 08/12/2021.  Please excuse him from work due to a foot injury on the RIGHT foot. He will follow-up with podiatry in one week to determine restrictions.        Sincerely,        Madison Infante DPM

## 2021-08-12 NOTE — PROGRESS NOTES
Chief complaint: Patient presents with:  Toe Pain      History of Present Illness: This 36 year old male is seen at the request of No ref. provider found for evaluation and suggestions of management of a RIGHT hallux toenail trauma. He dropped a dresser on the toe on 08/06/2021. The toe was painful and was bleeding so he went to the ED. He says the ED sutured the laceration on the side of the toe and prescribed him Keflex 500mg TID. He is still taking the Keflex.    Patient works at the [employer] and he plans on going to work after today's appointment.    No further pedal complaints today.       Patient chews 1 can a day. He is not interested quitting today or the quit plan referral.      /74 (BP Location: Left arm, Patient Position: Sitting, Cuff Size: Adult Regular)   Pulse 72   Temp (!) 96.7  F (35.9  C) (Tympanic)   Resp 16   SpO2 98%     Patient Active Problem List   Diagnosis     Chewing tobacco nicotine dependence with nicotine-induced disorder       Past Surgical History:   Procedure Laterality Date     NO HISTORY OF SURGERY         Current Outpatient Medications   Medication     cephALEXin (KEFLEX) 500 MG capsule     No current facility-administered medications for this visit.        No Known Allergies    Family History   Problem Relation Age of Onset     Family History Negative Mother      Family History Negative Sister      Family History Negative Brother        Social History     Socioeconomic History     Marital status:      Spouse name: Swetha     Number of children: 3     Years of education: 14     Highest education level: None   Occupational History     Occupation: equip      Employer: [employer]   Tobacco Use     Smoking status: Light Tobacco Smoker     Years: 10.00     Types: Dip, chew, snus or snuff     Smokeless tobacco: Current User     Types: Chew     Tobacco comment: 2 cans/week   Substance and Sexual Activity     Alcohol use: Yes     Alcohol/week: 0.0 standard  drinks     Comment: 1 weekend/month     Drug use: No     Sexual activity: Yes     Partners: Female   Other Topics Concern     Parent/sibling w/ CABG, MI or angioplasty before 65F 55M? Not Asked      Service Yes     Comment: [employer] --4 yrs -- honorable discharge     Blood Transfusions Yes     Caffeine Concern No     Occupational Exposure Not Asked     Hobby Hazards Not Asked     Sleep Concern Not Asked     Stress Concern Not Asked     Weight Concern Not Asked     Special Diet Not Asked     Back Care Not Asked     Exercise No     Comment: active     Bike Helmet Not Asked     Seat Belt Yes     Self-Exams Not Asked   Social History Narrative     None     Social Determinants of Health     Financial Resource Strain:      Difficulty of Paying Living Expenses:    Food Insecurity:      Worried About Running Out of Food in the Last Year:      Ran Out of Food in the Last Year:    Transportation Needs:      Lack of Transportation (Medical):      Lack of Transportation (Non-Medical):    Physical Activity:      Days of Exercise per Week:      Minutes of Exercise per Session:    Stress:      Feeling of Stress :    Social Connections:      Frequency of Communication with Friends and Family:      Frequency of Social Gatherings with Friends and Family:      Attends Jew Services:      Active Member of Clubs or Organizations:      Attends Club or Organization Meetings:      Marital Status:    Intimate Partner Violence:      Fear of Current or Ex-Partner:      Emotionally Abused:      Physically Abused:      Sexually Abused:        ROS: 10 point ROS neg other than the symptoms noted above in the HPI.  EXAM  Constitutional: healthy, alert and no distress    Psychiatric: mentation appears normal and affect normal/bright     RIGHT FOOT FOCUSED    VASCULAR:  -Dorsalis pedis pulse +2/4   -Posterior tibial pulse +2/4   -Capillary refill time < 3 seconds to hallux  -Hair growth Present to anterior leg and  ankle  NEURO:  -Light touch sensation intact to plantar forefoot  DERM:  -Skin temperature, texture and turgor WNL  -Toenails elongated, thickened, dystrophic and discolored x 5  -RIGHT hallux toenail is loose and not attached to the underlying nail bed on the distal 70% of the nail bed  ---A suture is through the nail plate in the central nail at the distal 1/3 of the nail plate and through the skin distal to the toenail -- suture is holding the toenail on the nail bed  ---Two sutures on the lateral laceration  ---Laceration extending from the mid lateral nail border extending distally and laterally and estimated to measure 2cm x 0.3cm with dehiscence of the laceration  ---Mild serous drainage to the laceration  ---Moderate serous drainage from beneath the nail bed  ---Post removal of toenail, there was a laceration on the dorsal lateral nail bed (estimated to measure 0.3cm x 1cm) and extends directly to the distal phalanx of the toe. Bone is palpable with normal color and consistency.  ---No severe erythema, no ascending erythema, no calor, no purulence, no malodor, no other SOI.   MSK:  -Mild pain on palpation to RIGHT dorsal hallux  -Muscle strength of ankles +5/5 for dorsiflexion, plantarflexion, ABDUction and ADDuction b/l    ============================================================    ASSESSMENT:  (S91.219A) Laceration of nail bed of toe, initial encounter  (primary encounter diagnosis)    (S92.421B) Open displaced fracture of distal phalanx of right great toe, initial encounter    (Z69.466) Right foot pain      PLAN:  -Patient evaluated and examined. Treatment options discussed with no educational barriers noted.  -Patient's toenail was sutured to the distal skin of the toe, but there was still moderate drainage beneath the toenail. Advised the patient to remove the toenail to see if the trauma from the dresser created a laceration through the nail bed. If the nail bed is punctured, there is a higher risk  of bone infection which could lead to needing long-term IV antibiotics or possibly amputation of infected bone or a life threatening infection. If there was trauma to the nail matrix from the dresser, then the toenail will not grow back whether or not the toenail is removed today. Phenol will not be used so the toenail can grow back if there was no trauma to the toenail from the dresser. Patient understands the risks and benefits of removing the toenail and he is in agreement with removing the toenail today.    -Toenail avulsion of right hallux toenail: Written and verbal consent obtained for toenail avulsion with possible incision and drainage and possible repair of lacerated nail bed. Reviewed risks and benefits of the procedure. There is also a risk of post procedure infection with or without the toenail avulsion procedure. A severe foot infection could lead to a proximal foot or leg amputation or loss of life, so the patient is advised to return to podiatry or the ED immediately if the patient notices any SOI. The patient is in agreement with this plan and wishes to proceed with the procedure. A time-out was performed to identify the correct patient, limb, digit and procedure.    An alcohol prep pad was applied to  to the base of the right hallux. The digit was injected with 7 mL of 1:1 of 2% Lidocaine plain and 0.5% marcaine plain. Adequate local anesthesia was obtained. A ring tournicot was applied to the digit and a chloroprep was applied to the hallux. A freer was used to loosen the nail from the underlying nail bed. An English Anvil and a hemostat were then used to remove the nail in total.     Upon removal of the toenail, a laceration was noted in the central nail bed extending from the middle of the nail bed to the lateral border of the toenail (estimated to measure 0.3cm x 1cm). There was non-viable hematoma tissue with a moderately macerated nail bed. The non-viable tissue and soft hematoma were  "debrided with a sharp ring curette and small curette. The bone was easily palpated at the base of the wound. The bone was of normal color and firm in consistency.     A total of 1/2 a liter of normal sterile saline was mixed with 1/8 of a bottle of betadine. The solution was poured over the nail bed and the nail bed was thoroughly cleaned.    The proximal and distal skin edges over the bone were able to be re-approximated and the skin edges were sutures with a simple suture technique using 4-0 Nylon. The laceration on the side of the toe was dehisced, so the sutures were removed and re-approximated with a simple suture technique also using 4-0 Nylon.    The tournicot was removed from the toe and there was a prompt, hyperemic response to the RIGHT hallux.    The wound was then dressed with betadine soaked Adaptic, betadine soaked gauze, dry gauze and 1\" coban. Additional dressing supplies were dispensed to the patient. He is to apply a small amount of baby shampoo to the wound daily (does not need to be thoroughly rinsed) followed by the above dressing with Medipore tape in place of coban. He should change the dressing daily and monitor for SOI.    -Patient was instructed to look for SOI (redness, swelling, pain, purulence, fever, chills, nausea, vomiting) and to return to podiatry or the emergency department immediately if there are any SOI.     -Keflex 500mg TID was renewed for an additional week due to the exposed bone still present beneath the nail bed.    -Offloading: Dispensed an open toed post-op shoe. Patient is advised to wear this daily at home and outside the house until the wound bed is healed.  ---Patient is advised not to work until the toe is healed due to the exposed bone leaving a higher risk for bone infection of the toe. The patient would like to return to work today, but he is advised against working. He is on his feet for long hours at a time in steel toed boots which will create additional " pressure on the toe. A work note was dispensed. Patient was advised to wait at least one week until his toe can be evaluated at one week post nail bed suture placement to evaluate for healing of the toe.    -Patient's fracture site will unlikely fully heal due to the gapping between the bone fragments. However, the bone fragment distally is a smaller fragment of bone and can unlikely be reattached with a screw. A k-wire could be considered but may potentially further fracture the bone fragment. Will not attempt open reduction or removal of the bone fragment unless it gives the patient pain in the future.     -Patient in agreement with the above treatment plan and all of patient's questions were answered.      RTC one week to evaluate the RIGHT dorsal hallux nail bed.        Madison Infante DPM

## 2021-08-12 NOTE — NURSING NOTE
"Chief Complaint   Patient presents with     Toe Pain       Initial /74 (BP Location: Left arm, Patient Position: Sitting, Cuff Size: Adult Regular)   Pulse 72   Temp (!) 96.7  F (35.9  C) (Tympanic)   Resp 16   SpO2 98%  Estimated body mass index is 25.83 kg/m  as calculated from the following:    Height as of 12/31/19: 1.778 m (5' 10\").    Weight as of 12/31/19: 81.6 kg (180 lb).  Medication Reconciliation: complete  Wilma Tamez LPN  "

## 2021-08-19 ENCOUNTER — OFFICE VISIT (OUTPATIENT)
Dept: PODIATRY | Facility: OTHER | Age: 36
End: 2021-08-19
Attending: PODIATRIST
Payer: COMMERCIAL

## 2021-08-19 ENCOUNTER — TELEPHONE (OUTPATIENT)
Dept: PODIATRY | Facility: OTHER | Age: 36
End: 2021-08-19

## 2021-08-19 ENCOUNTER — ANCILLARY PROCEDURE (OUTPATIENT)
Dept: GENERAL RADIOLOGY | Facility: OTHER | Age: 36
End: 2021-08-19
Attending: PODIATRIST
Payer: COMMERCIAL

## 2021-08-19 VITALS
DIASTOLIC BLOOD PRESSURE: 69 MMHG | TEMPERATURE: 97 F | OXYGEN SATURATION: 96 % | SYSTOLIC BLOOD PRESSURE: 125 MMHG | HEART RATE: 68 BPM

## 2021-08-19 DIAGNOSIS — S92.421B OPEN DISPLACED FRACTURE OF DISTAL PHALANX OF RIGHT GREAT TOE, INITIAL ENCOUNTER: ICD-10-CM

## 2021-08-19 DIAGNOSIS — F17.220 CHEWING TOBACCO NICOTINE DEPENDENCE WITHOUT COMPLICATION: ICD-10-CM

## 2021-08-19 DIAGNOSIS — Z71.6 TOBACCO ABUSE COUNSELING: ICD-10-CM

## 2021-08-19 DIAGNOSIS — S91.219A LACERATION OF NAIL BED OF TOE, INITIAL ENCOUNTER: Primary | ICD-10-CM

## 2021-08-19 DIAGNOSIS — M79.671 RIGHT FOOT PAIN: ICD-10-CM

## 2021-08-19 DIAGNOSIS — S91.219A LACERATION OF NAIL BED OF TOE, INITIAL ENCOUNTER: ICD-10-CM

## 2021-08-19 PROCEDURE — 99024 POSTOP FOLLOW-UP VISIT: CPT | Performed by: PODIATRIST

## 2021-08-19 PROCEDURE — 73630 X-RAY EXAM OF FOOT: CPT | Mod: TC | Performed by: RADIOLOGY

## 2021-08-19 ASSESSMENT — PAIN SCALES - GENERAL: PAINLEVEL: NO PAIN (0)

## 2021-08-19 NOTE — NURSING NOTE
"Chief Complaint   Patient presents with     RECHECK     matrix       Initial /69   Pulse 68   Temp 97  F (36.1  C)   SpO2 96%  Estimated body mass index is 25.83 kg/m  as calculated from the following:    Height as of 12/31/19: 1.778 m (5' 10\").    Weight as of 12/31/19: 81.6 kg (180 lb).  Medication Reconciliation: complete  Marianela Cole MA  "

## 2021-08-19 NOTE — PROGRESS NOTES
Chief complaint: Patient presents with:  RECHECK: matrix        History of Present Illness: This 36 year old male is seen for follow-up management of a RIGHT hallux toenail trauma. The toe is still painful but less painful than it was. He is changing the dressing daily with Adaptic, betadine soaked gauze, dry gauze and tape. He has not been working for the past week. 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 He is wearing a post-op shoe to offload the toe.    No further pedal complaints today.       Patient chews 1 can a day. He is not interested quitting today or the quit plan referral.      /69   Pulse 68   Temp 97  F (36.1  C)   SpO2 96%     Patient Active Problem List   Diagnosis     Chewing tobacco nicotine dependence with nicotine-induced disorder       Past Surgical History:   Procedure Laterality Date     NO HISTORY OF SURGERY         Current Outpatient Medications   Medication     cephALEXin (KEFLEX) 500 MG capsule     No current facility-administered medications for this visit.        No Known Allergies    Family History   Problem Relation Age of Onset     Family History Negative Mother      Family History Negative Sister      Family History Negative Brother        Social History     Socioeconomic History     Marital status:      Spouse name: Swetha     Number of children: 3     Years of education: 14     Highest education level: None   Occupational History     Occupation: equip      Employer: [employer]   Tobacco Use     Smoking status: Light Tobacco Smoker     Years: 10.00     Types: Dip, chew, snus or snuff     Smokeless tobacco: Current User     Types: Chew     Tobacco comment: 2 cans/week   Substance and Sexual Activity     Alcohol use: Yes     Alcohol/week: 0.0 standard drinks     Comment: 1 weekend/month     Drug use: No     Sexual activity: Yes     Partners: Female   Other Topics Concern      Parent/sibling w/ CABG, MI or angioplasty before 65F 55M? Not Asked      Service Yes     Comment: [employer] --4 yrs -- honorable discharge     Blood Transfusions Yes     Caffeine Concern No     Occupational Exposure Not Asked     Hobby Hazards Not Asked     Sleep Concern Not Asked     Stress Concern Not Asked     Weight Concern Not Asked     Special Diet Not Asked     Back Care Not Asked     Exercise No     Comment: active     Bike Helmet Not Asked     Seat Belt Yes     Self-Exams Not Asked   Social History Narrative     None     Social Determinants of Health     Financial Resource Strain:      Difficulty of Paying Living Expenses:    Food Insecurity:      Worried About Running Out of Food in the Last Year:      Ran Out of Food in the Last Year:    Transportation Needs:      Lack of Transportation (Medical):      Lack of Transportation (Non-Medical):    Physical Activity:      Days of Exercise per Week:      Minutes of Exercise per Session:    Stress:      Feeling of Stress :    Social Connections:      Frequency of Communication with Friends and Family:      Frequency of Social Gatherings with Friends and Family:      Attends Alevism Services:      Active Member of Clubs or Organizations:      Attends Club or Organization Meetings:      Marital Status:    Intimate Partner Violence:      Fear of Current or Ex-Partner:      Emotionally Abused:      Physically Abused:      Sexually Abused:        ROS: 10 point ROS neg other than the symptoms noted above in the HPI.  EXAM  Constitutional: healthy, alert and no distress    Psychiatric: mentation appears normal and affect normal/bright     RIGHT FOOT FOCUSED    VASCULAR:  -Dorsalis pedis pulse +2/4   -Posterior tibial pulse +2/4   -Capillary refill time < 3 seconds to hallux  -Hair growth Present to anterior leg and ankle  NEURO:  -Light touch sensation intact to plantar forefoot  DERM:  -Skin temperature, texture and turgor WNL  -Toenails elongated,  thickened, dystrophic and discolored x 5  Wound Location:  RIGHT dorsal hallux nail bed  08/19/2021  Measurement:  0.6cm x 0.2cm x 0.1cm to subcutaneous tissue (wound is a mild comma shape and the thickest area of the wound is 0.3cm) -- no bone palpated  ---Sutures intact with subcutaneous tissue exposed along the nail bed laceration but no bone exposed  ---Lateral hallux laceration skin is well approximated without dehiscence  Drainage:  100% mild serous  Odor:  None  Undermining:  None  Edges:  Intact  Base:  Marbled 80% marbled 20% fibrotic  Surrounding Skin: Intact  No severe erythema, no ascending erythema, no calor, no purulence, no malodor, no other SOI.     MSK:  -Mild pain on palpation to RIGHT dorsal hallux  -Muscle strength of ankles +5/5 for dorsiflexion, plantarflexion, ABDUction and ADDuction b/l    RIGHT FOOT RADIOGRAPH 08/19/2021  IMPRESSION: Terminal tuft fracture of the great toe no change in  alignment from August 6, 2021    YAO HUSAIN MD      ============================================================    ASSESSMENT:  (S91.219A) Laceration of nail bed of toe, initial encounter  (primary encounter diagnosis)    (S92.421B) Open displaced fracture of distal phalanx of right great toe, initial encounter    (M79.406) Right foot pain        PLAN:  -Patient evaluated and examined. Treatment options discussed with no educational barriers noted.  -The laceration skin edges were intact without dehiscence. The dorsal nail bed laceration is no longer to the depth of the bone. The nail bed is healing well without any concerns for infection.  ---Patient is still taking antibiotics (Keflex 500mg TID) -- continue until prescription is out  ---No debridement needed today.  ---Applied Mepilex Ag, gauze and tape  ---Patient to continue with this dressing daily    -Continue offloading with post-op shoe. Patient is advised to not work for a minimum of one more week (potentially two more weeks) due to the extend  of bone exposure one week ago. This will provide more time for the nail bed to heal. Patient is in agreement with this plan.    -Patient was instructed to look for SOI (redness, swelling, pain, purulence, fever, chills, nausea, vomiting) and to return to podiatry or the emergency department immediately if there are any SOI.     -Patient's fracture site will unlikely fully heal due to the gapping between the bone fragments. However, the bone fragment distally is a smaller fragment of bone and can unlikely be reattached with a screw. A k-wire could be considered but may potentially further fracture the bone fragment. Will not attempt open reduction or removal of the bone fragment unless it gives the patient pain in the future.     -Radiographs from 08/19/2021 -- no concerning changes without change in alignment of fracture fragment at the distal end of the distal phalanx. Patient will be called with these results.    -Tobacco cessation discussed with patient including the benefits of quitting and the risks of not quitting. The Quit Plan referral was offered and the patient is not interested in the referral today. Patient is not interested in quitting today.      -Patient in agreement with the above treatment plan and all of patient's questions were answered.      RTC one week to evaluate the RIGHT dorsal hallux nail bed.        Madison Infante DPM

## 2021-08-26 ENCOUNTER — OFFICE VISIT (OUTPATIENT)
Dept: PODIATRY | Facility: OTHER | Age: 36
End: 2021-08-26
Attending: PODIATRIST
Payer: COMMERCIAL

## 2021-08-26 VITALS
TEMPERATURE: 98.2 F | HEART RATE: 59 BPM | SYSTOLIC BLOOD PRESSURE: 133 MMHG | RESPIRATION RATE: 16 BRPM | OXYGEN SATURATION: 97 % | DIASTOLIC BLOOD PRESSURE: 72 MMHG

## 2021-08-26 DIAGNOSIS — Z71.6 TOBACCO ABUSE COUNSELING: ICD-10-CM

## 2021-08-26 DIAGNOSIS — F17.220 CHEWING TOBACCO NICOTINE DEPENDENCE WITHOUT COMPLICATION: ICD-10-CM

## 2021-08-26 DIAGNOSIS — S92.421D OPEN DISPLACED FRACTURE OF DISTAL PHALANX OF RIGHT GREAT TOE WITH ROUTINE HEALING, SUBSEQUENT ENCOUNTER: Primary | ICD-10-CM

## 2021-08-26 PROCEDURE — 99212 OFFICE O/P EST SF 10 MIN: CPT | Performed by: PODIATRIST

## 2021-08-26 ASSESSMENT — PAIN SCALES - GENERAL: PAINLEVEL: NO PAIN (0)

## 2021-08-26 NOTE — PATIENT INSTRUCTIONS
Thank you for allowing  and our Podiatry team to participate in your care. Please call our office at [phone] with scheduling questions or with any other questions or concerns.

## 2021-08-26 NOTE — PROGRESS NOTES
Chief complaint: Patient presents with:  RECHECK: avulsion        History of Present Illness: This 36 year old male is seen for follow-up management of a RIGHT hallux toenail trauma. The toe is  but it is not as painful as it was after the injury. He has applied a Mepiled Ag and tape dressing to the toe every day. He presents in closed tennis shoes. He is wondering when he can get back to work.    No further pedal complaints today.       Patient chews 1 can a day. He is not interested quitting today or the quit plan referral.      /72 (BP Location: Left arm, Patient Position: Sitting, Cuff Size: Adult Regular)   Pulse 59   Temp 98.2  F (36.8  C) (Tympanic)   Resp 16   SpO2 97%     Patient Active Problem List   Diagnosis     Chewing tobacco nicotine dependence with nicotine-induced disorder       Past Surgical History:   Procedure Laterality Date     NO HISTORY OF SURGERY         No current outpatient medications on file.     No current facility-administered medications for this visit.        No Known Allergies    Family History   Problem Relation Age of Onset     Family History Negative Mother      Family History Negative Sister      Family History Negative Brother        Social History     Socioeconomic History     Marital status:      Spouse name: Swetha     Number of children: 3     Years of education: 14     Highest education level: None   Occupational History     Occupation: equip      Employer: [employer]   Tobacco Use     Smoking status: Light Tobacco Smoker     Years: 10.00     Types: Dip, chew, snus or snuff     Smokeless tobacco: Current User     Types: Chew     Tobacco comment: 2 cans/week   Substance and Sexual Activity     Alcohol use: Yes     Alcohol/week: 0.0 standard drinks     Comment: 1 weekend/month     Drug use: No     Sexual activity: Yes     Partners: Female   Other Topics Concern     Parent/sibling w/ CABG, MI or angioplasty before 65F 55M? Not Asked       Service Yes     Comment: [employer] --4 yrs -- honorable discharge     Blood Transfusions Yes     Caffeine Concern No     Occupational Exposure Not Asked     Hobby Hazards Not Asked     Sleep Concern Not Asked     Stress Concern Not Asked     Weight Concern Not Asked     Special Diet Not Asked     Back Care Not Asked     Exercise No     Comment: active     Bike Helmet Not Asked     Seat Belt Yes     Self-Exams Not Asked   Social History Narrative     None     Social Determinants of Health     Financial Resource Strain:      Difficulty of Paying Living Expenses:    Food Insecurity:      Worried About Running Out of Food in the Last Year:      Ran Out of Food in the Last Year:    Transportation Needs:      Lack of Transportation (Medical):      Lack of Transportation (Non-Medical):    Physical Activity:      Days of Exercise per Week:      Minutes of Exercise per Session:    Stress:      Feeling of Stress :    Social Connections:      Frequency of Communication with Friends and Family:      Frequency of Social Gatherings with Friends and Family:      Attends Quaker Services:      Active Member of Clubs or Organizations:      Attends Club or Organization Meetings:      Marital Status:    Intimate Partner Violence:      Fear of Current or Ex-Partner:      Emotionally Abused:      Physically Abused:      Sexually Abused:        ROS: 10 point ROS neg other than the symptoms noted above in the HPI.  EXAM  Constitutional: healthy, alert and no distress    Psychiatric: mentation appears normal and affect normal/bright     RIGHT FOOT FOCUSED    VASCULAR:  -Dorsalis pedis pulse +2/4   -Posterior tibial pulse +2/4   -Capillary refill time < 3 seconds to hallux  -Hair growth Present to anterior leg and ankle  NEURO:  -Light touch sensation intact to plantar forefoot  DERM:  -Skin temperature, texture and turgor WNL  -Toenails elongated, thickened, dystrophic and discolored x 5  Wound Location:  RIGHT dorsal  hallux nail bed  08/26/2021: HEALED with mild scab across dorsal medial nail bed  ---No open wounds and no drainage  ---Skin is well approximated and healed at all suture sites post removal of sutures  ---No severe erythema, no ascending erythema, no calor, no purulence, no malodor, no other SOI.       08/19/2021  Measurement:  0.6cm x 0.2cm x 0.1cm to subcutaneous tissue (wound is a mild comma shape and the thickest area of the wound is 0.3cm) -- no bone palpated  ---Sutures intact with subcutaneous tissue exposed along the nail bed laceration but no bone exposed  ---Lateral hallux laceration skin is well approximated without dehiscence  Drainage:  100% mild serous  Odor:  None  Undermining:  None  Edges:  Intact  Base:  Marbled 80% marbled 20% fibrotic  Surrounding Skin: Intact  No severe erythema, no ascending erythema, no calor, no purulence, no malodor, no other SOI.     MSK:  -Mild tenderness on palpation to RIGHT dorsal hallux nail bed  -Muscle strength of ankles +5/5 for dorsiflexion, plantarflexion, ABDUction and ADDuction b/l    RIGHT FOOT RADIOGRAPH 08/19/2021  IMPRESSION: Terminal tuft fracture of the great toe no change in  alignment from August 6, 2021    YAO HUSAIN MD      ============================================================    ASSESSMENT:  (S92.421D) Open displaced fracture of distal phalanx of right great toe with routine healing, subsequent encounter  (primary encounter diagnosis)      PLAN:  -Patient evaluated and examined. Treatment options discussed with no educational barriers noted.  -Removed sutures   ---Applied Mepilex Ag and Medipore tape to dorsal nail bed -- there still remains a scab (well-adhered) over a small portion of the nail bed. The Mepilex will provide a little cushion on the nail bed and will treat the nail bed if the wound opens as patient resumes working.  ---Patient to change the dressing daily  ---Patient instructed to continue to monitor the incision sites for  SOI (redness, swelling, pain, purulence, fever, chills, nausea, vomiting) and return to podiatry or the Emergency Department immediately if there are any noticeable SOI.    -Patient may return to work on Monday, 08/30/2021. He may resume his steel toed boots. The fracture site will unlikely heal any further with more immobilization and the steel toed boots will provide stability for the toe. He is walking comfortably in a tennis shoe today.    -Radiographs ordered for follow-up visit on 09/09/2021 prior to visit to check the current fracture site of the RIGHT hallux.    -Tobacco cessation discussed with patient including the benefits of quitting and the risks of not quitting. The Quit Plan referral was offered and the patient is not interested in the referral today. Patient is not interested in quitting today.      -Patient in agreement with the above treatment plan and all of patient's questions were answered.      RTC two weeks with x-rays prior to appointment for possible final follow-up to evaluate the RIGHT dorsal hallux nail bed.        Madison Infante DPM

## 2021-08-26 NOTE — LETTER
August 26, 2021      Ej Gustafson  [address] [place] [id number]        To Whom It May Concern:    Ej Gustafson was seen in our clinic on 08/26/2021. He may return to work without restrictions on 08/30/2021.      Sincerely,        Madison Infante DPM

## 2021-08-26 NOTE — NURSING NOTE
"Chief Complaint   Patient presents with     RECHECK     avulsion       Initial /72 (BP Location: Left arm, Patient Position: Sitting, Cuff Size: Adult Regular)   Pulse 59   Temp 98.2  F (36.8  C) (Tympanic)   Resp 16   SpO2 97%  Estimated body mass index is 25.83 kg/m  as calculated from the following:    Height as of 12/31/19: 1.778 m (5' 10\").    Weight as of 12/31/19: 81.6 kg (180 lb).  Medication Reconciliation: complete  Wilma Tamez LPN  "

## 2021-09-09 ENCOUNTER — OFFICE VISIT (OUTPATIENT)
Dept: PODIATRY | Facility: OTHER | Age: 36
End: 2021-09-09
Attending: PODIATRIST
Payer: COMMERCIAL

## 2021-09-09 ENCOUNTER — ANCILLARY PROCEDURE (OUTPATIENT)
Dept: GENERAL RADIOLOGY | Facility: OTHER | Age: 36
End: 2021-09-09
Attending: PODIATRIST
Payer: COMMERCIAL

## 2021-09-09 VITALS
OXYGEN SATURATION: 98 % | DIASTOLIC BLOOD PRESSURE: 72 MMHG | HEART RATE: 64 BPM | BODY MASS INDEX: 25.17 KG/M2 | HEIGHT: 70 IN | WEIGHT: 175.8 LBS | RESPIRATION RATE: 16 BRPM | TEMPERATURE: 96.7 F | SYSTOLIC BLOOD PRESSURE: 118 MMHG

## 2021-09-09 DIAGNOSIS — S92.421D OPEN DISPLACED FRACTURE OF DISTAL PHALANX OF RIGHT GREAT TOE WITH ROUTINE HEALING, SUBSEQUENT ENCOUNTER: ICD-10-CM

## 2021-09-09 DIAGNOSIS — F17.220 CHEWING TOBACCO NICOTINE DEPENDENCE WITHOUT COMPLICATION: ICD-10-CM

## 2021-09-09 DIAGNOSIS — S92.421D OPEN DISPLACED FRACTURE OF DISTAL PHALANX OF RIGHT GREAT TOE WITH ROUTINE HEALING, SUBSEQUENT ENCOUNTER: Primary | ICD-10-CM

## 2021-09-09 DIAGNOSIS — Z71.6 TOBACCO ABUSE COUNSELING: ICD-10-CM

## 2021-09-09 PROCEDURE — 99212 OFFICE O/P EST SF 10 MIN: CPT | Performed by: PODIATRIST

## 2021-09-09 PROCEDURE — 73630 X-RAY EXAM OF FOOT: CPT | Mod: TC | Performed by: RADIOLOGY

## 2021-09-09 RX ORDER — DOXYCYCLINE 100 MG/1
100 CAPSULE ORAL 2 TIMES DAILY
COMMUNITY
Start: 2021-09-07 | End: 2021-12-03

## 2021-09-09 ASSESSMENT — PAIN SCALES - GENERAL: PAINLEVEL: NO PAIN (0)

## 2021-09-09 ASSESSMENT — MIFFLIN-ST. JEOR: SCORE: 1738.67

## 2021-09-09 NOTE — NURSING NOTE
"Chief Complaint   Patient presents with     Consult     xray prior, follow up, avulsion, per Dr. Infante       Initial /72 (BP Location: Left arm, Patient Position: Chair, Cuff Size: Adult Large)   Pulse 64   Temp (!) 96.7  F (35.9  C) (Tympanic)   Resp 16   Ht 1.778 m (5' 10\")   Wt 79.7 kg (175 lb 12.8 oz)   SpO2 98%   BMI 25.22 kg/m   Estimated body mass index is 25.22 kg/m  as calculated from the following:    Height as of this encounter: 1.778 m (5' 10\").    Weight as of this encounter: 79.7 kg (175 lb 12.8 oz).  Medication Reconciliation: complete  Cristel Dahl LPN    "

## 2021-09-09 NOTE — PROGRESS NOTES
"Chief complaint: Patient presents with:  Consult: xray prior, follow up, avulsion, per Dr. Infante        History of Present Illness: This 36 year old male is seen for follow-up management of a RIGHT hallux toenail trauma.     Patient has not had a dressing on the toe since his last podiatry appointment. He has not noticed any drainage on his sock. He is working in steel toed boots without any pain.     No further pedal complaints today.     No further pedal complaints today.       Patient chews 1 can a day. He is not interested quitting today or the quit plan referral.      /72 (BP Location: Left arm, Patient Position: Chair, Cuff Size: Adult Large)   Pulse 64   Temp (!) 96.7  F (35.9  C) (Tympanic)   Resp 16   Ht 1.778 m (5' 10\")   Wt 79.7 kg (175 lb 12.8 oz)   SpO2 98%   BMI 25.22 kg/m      Patient Active Problem List   Diagnosis     Chewing tobacco nicotine dependence with nicotine-induced disorder       Past Surgical History:   Procedure Laterality Date     NO HISTORY OF SURGERY         Current Outpatient Medications   Medication     doxycycline hyclate (VIBRAMYCIN) 100 MG capsule     No current facility-administered medications for this visit.        No Known Allergies    Family History   Problem Relation Age of Onset     Family History Negative Mother      Family History Negative Sister      Family History Negative Brother        Social History     Socioeconomic History     Marital status:      Spouse name: Swetha     Number of children: 3     Years of education: 14     Highest education level: None   Occupational History     Occupation: equip      Employer: [employer]   Tobacco Use     Smoking status: Light Tobacco Smoker     Years: 10.00     Types: Dip, chew, snus or snuff     Smokeless tobacco: Current User     Types: Chew     Tobacco comment: 2 cans/week   Substance and Sexual Activity     Alcohol use: Yes     Alcohol/week: 0.0 standard drinks     Comment: 1 " weekend/month     Drug use: No     Sexual activity: Yes     Partners: Female   Other Topics Concern     Parent/sibling w/ CABG, MI or angioplasty before 65F 55M? Not Asked      Service Yes     Comment: [employer] --4 yrs -- honorable discharge     Blood Transfusions Yes     Caffeine Concern No     Occupational Exposure Not Asked     Hobby Hazards Not Asked     Sleep Concern Not Asked     Stress Concern Not Asked     Weight Concern Not Asked     Special Diet Not Asked     Back Care Not Asked     Exercise No     Comment: active     Bike Helmet Not Asked     Seat Belt Yes     Self-Exams Not Asked   Social History Narrative     None     Social Determinants of Health     Financial Resource Strain:      Difficulty of Paying Living Expenses:    Food Insecurity:      Worried About Running Out of Food in the Last Year:      Ran Out of Food in the Last Year:    Transportation Needs:      Lack of Transportation (Medical):      Lack of Transportation (Non-Medical):    Physical Activity:      Days of Exercise per Week:      Minutes of Exercise per Session:    Stress:      Feeling of Stress :    Social Connections:      Frequency of Communication with Friends and Family:      Frequency of Social Gatherings with Friends and Family:      Attends Amish Services:      Active Member of Clubs or Organizations:      Attends Club or Organization Meetings:      Marital Status:    Intimate Partner Violence:      Fear of Current or Ex-Partner:      Emotionally Abused:      Physically Abused:      Sexually Abused:        ROS: 10 point ROS neg other than the symptoms noted above in the HPI.  EXAM  Constitutional: healthy, alert and no distress    Psychiatric: mentation appears normal and affect normal/bright     RIGHT FOOT FOCUSED    VASCULAR:  -Dorsalis pedis pulse +2/4   -Posterior tibial pulse +2/4   -Capillary refill time < 3 seconds to hallux  -Hair growth Present to anterior leg and ankle  NEURO:  -Light touch sensation  intact to plantar forefoot  DERM:  -Skin temperature, texture and turgor WNL    Wound Location:  RIGHT dorsal hallux nail bed    09/09/2021: HEALED    08/26/2021: HEALED with mild scab across dorsal medial nail bed  ---No open wounds and no drainage  ---Skin is well approximated and healed at all suture sites post removal of sutures  ---No severe erythema, no ascending erythema, no calor, no purulence, no malodor, no other SOI.       08/19/2021  Measurement:  0.6cm x 0.2cm x 0.1cm to subcutaneous tissue (wound is a mild comma shape and the thickest area of the wound is 0.3cm) -- no bone palpated  ---Sutures intact with subcutaneous tissue exposed along the nail bed laceration but no bone exposed  ---Lateral hallux laceration skin is well approximated without dehiscence    MSK:  -No pain or tenderness on palpation to RIGHT dorsal hallux nail bed  -Muscle strength of ankles +5/5 for dorsiflexion, plantarflexion, ABDUction and ADDuction b/l    RIGHT FOOT RADIOGRAPH 08/19/2021  IMPRESSION: Terminal tuft fracture of the great toe no change in  alignment from August 6, 2021    YAO HUSAIN MD      RIGHT FOOT RADIOGRAPH 09/09/2021  IMPRESSION: Healing great toe fracture without change in alignment from previous examination.    YAO HUSAIN MD     ============================================================    ASSESSMENT:  (N42.093D) Open displaced fracture of distal phalanx of right great toe with routine healing, subsequent encounter  (primary encounter diagnosis)    (Z71.6) Tobacco abuse counseling    (F17.220) Chewing tobacco nicotine dependence without complication        PLAN:  -Patient evaluated and examined. Treatment options discussed with no educational barriers noted.    -Radiographs reviewed from 09/09/2021 -- fracture is healing on the distal phalanx of the RIGHT hallux.    -It is possible the patient may develop a dorsal distal bone spur on the distal phalanx in the future. If this occurs, he may  consider a silicone toe sleeve or a surgery for a bone spur resection.  -Wear stability shoe gear (steel toed boots or tennis shoes) for three more weeks for the most optimal healing of the toe fracture. Patient is advised to call the clinic if pain increases or if a new wound develops on the toe.    -Tobacco cessation discussed with patient including the benefits of quitting and the risks of not quitting. The Quit Plan referral was offered and the patient is not interested in the referral today. Patient is not interested in quitting today.    -Patient in agreement with the above treatment plan and all of patient's questions were answered.      RTC as needed      Madison Infante DPM

## 2021-10-02 ENCOUNTER — HEALTH MAINTENANCE LETTER (OUTPATIENT)
Age: 36
End: 2021-10-02

## 2021-11-15 ENCOUNTER — TELEPHONE (OUTPATIENT)
Dept: FAMILY MEDICINE | Facility: OTHER | Age: 36
End: 2021-11-15
Payer: COMMERCIAL

## 2021-11-15 NOTE — TELEPHONE ENCOUNTER
11:23 AM    Reason for Call: Phone Call    Description: Patient called and is wanting to set up [facility] appt with Dr. Cleary -- I let him know that Dr. Cleayr is not currently accepting new patients - he states he spoke with Dr. Cleary personally and was told that he would see him. Please call him back to schedule if this is approved     Was an appointment offered for this call? No  If yes : Appointment type              Date    Preferred method for responding to this message: Telephone Call  What is your phone number ?[phone]    If we cannot reach you directly, may we leave a detailed response at the number you provided? Yes    Can this message wait until your PCP/provider returns, if available today? Not applicable    Tori Castro

## 2021-11-22 NOTE — PROGRESS NOTES
Assessment & Plan     Chronic left shoulder pain  Left shoulder tendonitis  - XR Shoulder Left G/E 3 Views; Future  - predniSONE (DELTASONE) 20 MG tablet; Take 1 tablet (20 mg) by mouth 2 times daily  - nabumetone (RELAFEN) 750 MG tablet; Take 1 tablet (750 mg) by mouth 2 times daily  - Physical Therapy Referral; Future  Will treat with above. Start with steroid then go to 10 days of Relafen after steroids.   Add PT  If fails - steroid injection vs mri.    Discussed in length conservative measures of OTC medications for pain, Ice/Heat, elevation and the concept of R.I.C.E.. Continue behavioral changes and proper body mechanics to allow for healing. Follow up as directed.                  No follow-ups on file.    Kiko Cleary MD  [facility] - JASMINA Pagan is a 36 year old who presents for the following health issues     HPI     Pain History:  When did you first notice your pain? - 5- 6 weeks   Have you seen any provider previously for this issue? No  How has your pain affected your ability to work? Has been trying not to use it  Where in your body do you have pain? Musculoskeletal problem/pain  Onset/Duration:  Over a 1 month  Description  Location: shoulder - left  Joint Swelling: no  Redness: no  Pain: YES  Warmth: no  Intensity:  moderate  Progression of Symptoms:  same and constant  Accompanying signs and symptoms:   Fevers: no  Numbness/tingling/weakness: YES  History  Trauma to the area: not sure  Recent illness:  no  Previous similar problem: no  Previous evaluation:  no  Precipitating or alleviating factors:  Aggravating factors include: lifting, exercise and overuse  Therapies tried and outcome: ice and Ibuprofen was taking 2 TID for 3 week - minimal at most help  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      Objective    /62   Pulse 67   Temp 96.8  F (36  C)   Ht 1.791 m (5' 10.5\")   Wt 79.4 kg (175 lb)   SpO2 96%   BMI 24.75 kg/m    Body mass index is 24.75 kg/m .  Physical Exam   GENERAL: healthy, alert and no distress  MS: left shoulder no gross musculoskeletal defects noted, no edema, full ROM but anterior deep joint pain. Strength intact grossly       Results for orders placed or performed in visit on 12/03/21   XR Shoulder Left G/E 3 Views     Status: None    Narrative    PROCEDURE:  XR SHOULDER LEFT G/E 3 VIEWS    HISTORY: Chronic left shoulder pain; Chronic left shoulder pain    COMPARISON:  None.    TECHNIQUE:  4 views of the left shoulder were obtained.    FINDINGS:  No fracture or dislocation is identified. The joint spaces  are preserved.        Impression    IMPRESSION: Normal left shoulder      YAO HUSAIN MD         SYSTEM ID:  [id number]               "

## 2021-12-03 ENCOUNTER — OFFICE VISIT (OUTPATIENT)
Dept: FAMILY MEDICINE | Facility: OTHER | Age: 36
End: 2021-12-03
Attending: FAMILY MEDICINE
Payer: COMMERCIAL

## 2021-12-03 ENCOUNTER — ANCILLARY PROCEDURE (OUTPATIENT)
Dept: GENERAL RADIOLOGY | Facility: OTHER | Age: 36
End: 2021-12-03
Attending: FAMILY MEDICINE
Payer: COMMERCIAL

## 2021-12-03 VITALS
HEIGHT: 71 IN | TEMPERATURE: 96.8 F | HEART RATE: 67 BPM | WEIGHT: 175 LBS | OXYGEN SATURATION: 96 % | BODY MASS INDEX: 24.5 KG/M2 | DIASTOLIC BLOOD PRESSURE: 62 MMHG | SYSTOLIC BLOOD PRESSURE: 110 MMHG

## 2021-12-03 DIAGNOSIS — G89.29 CHRONIC LEFT SHOULDER PAIN: ICD-10-CM

## 2021-12-03 DIAGNOSIS — M77.8 LEFT SHOULDER TENDONITIS: ICD-10-CM

## 2021-12-03 DIAGNOSIS — M25.512 CHRONIC LEFT SHOULDER PAIN: ICD-10-CM

## 2021-12-03 DIAGNOSIS — M25.512 CHRONIC LEFT SHOULDER PAIN: Primary | ICD-10-CM

## 2021-12-03 DIAGNOSIS — G89.29 CHRONIC LEFT SHOULDER PAIN: Primary | ICD-10-CM

## 2021-12-03 PROCEDURE — 73030 X-RAY EXAM OF SHOULDER: CPT | Mod: TC | Performed by: RADIOLOGY

## 2021-12-03 PROCEDURE — 99213 OFFICE O/P EST LOW 20 MIN: CPT | Performed by: FAMILY MEDICINE

## 2021-12-03 RX ORDER — PREDNISONE 20 MG/1
20 TABLET ORAL 2 TIMES DAILY
Qty: 10 TABLET | Refills: 0 | Status: SHIPPED | OUTPATIENT
Start: 2021-12-03 | End: 2022-08-19

## 2021-12-03 ASSESSMENT — PAIN SCALES - GENERAL: PAINLEVEL: EXTREME PAIN (8)

## 2021-12-03 ASSESSMENT — ANXIETY QUESTIONNAIRES
1. FEELING NERVOUS, ANXIOUS, OR ON EDGE: NOT AT ALL
3. WORRYING TOO MUCH ABOUT DIFFERENT THINGS: NOT AT ALL
4. TROUBLE RELAXING: NOT AT ALL
6. BECOMING EASILY ANNOYED OR IRRITABLE: NOT AT ALL
GAD7 TOTAL SCORE: 0
5. BEING SO RESTLESS THAT IT IS HARD TO SIT STILL: NOT AT ALL
2. NOT BEING ABLE TO STOP OR CONTROL WORRYING: NOT AT ALL
7. FEELING AFRAID AS IF SOMETHING AWFUL MIGHT HAPPEN: NOT AT ALL

## 2021-12-03 ASSESSMENT — MIFFLIN-ST. JEOR: SCORE: 1742.98

## 2021-12-03 NOTE — NURSING NOTE
"Chief Complaint   Patient presents with     Establish Care       Initial /62   Pulse 67   Temp 96.8  F (36  C)   Ht 1.791 m (5' 10.5\")   Wt 79.4 kg (175 lb)   SpO2 96%   BMI 24.75 kg/m   Estimated body mass index is 24.75 kg/m  as calculated from the following:    Height as of this encounter: 1.791 m (5' 10.5\").    Weight as of this encounter: 79.4 kg (175 lb).  Medication Reconciliation: complete  Corey Pradhan LPN  "

## 2021-12-04 ASSESSMENT — PATIENT HEALTH QUESTIONNAIRE - PHQ9: SUM OF ALL RESPONSES TO PHQ QUESTIONS 1-9: 0

## 2021-12-04 ASSESSMENT — ANXIETY QUESTIONNAIRES: GAD7 TOTAL SCORE: 0

## 2021-12-13 ENCOUNTER — TELEPHONE (OUTPATIENT)
Dept: FAMILY MEDICINE | Facility: OTHER | Age: 36
End: 2021-12-13
Payer: COMMERCIAL

## 2022-05-14 ENCOUNTER — HEALTH MAINTENANCE LETTER (OUTPATIENT)
Age: 37
End: 2022-05-14

## 2022-08-18 ENCOUNTER — HOSPITAL ENCOUNTER (EMERGENCY)
Facility: HOSPITAL | Age: 37
Discharge: HOME | End: 2022-08-18
Attending: EMERGENCY MEDICINE | Admitting: EMERGENCY MEDICINE
Payer: COMMERCIAL

## 2022-08-18 VITALS
DIASTOLIC BLOOD PRESSURE: 107 MMHG | HEART RATE: 59 BPM | OXYGEN SATURATION: 98 % | TEMPERATURE: 97 F | RESPIRATION RATE: 16 BRPM | SYSTOLIC BLOOD PRESSURE: 137 MMHG

## 2022-08-18 DIAGNOSIS — T78.2XXA ANAPHYLAXIS, INITIAL ENCOUNTER: ICD-10-CM

## 2022-08-18 PROCEDURE — 96361 HYDRATE IV INFUSION ADD-ON: CPT

## 2022-08-18 PROCEDURE — 250N000011 HC RX IP 250 OP 636: Performed by: EMERGENCY MEDICINE

## 2022-08-18 PROCEDURE — 250N000009 HC RX 250

## 2022-08-18 PROCEDURE — 99284 EMERGENCY DEPT VISIT MOD MDM: CPT | Mod: 25

## 2022-08-18 PROCEDURE — 250N000011 HC RX IP 250 OP 636

## 2022-08-18 PROCEDURE — 258N000003 HC RX IP 258 OP 636: Performed by: EMERGENCY MEDICINE

## 2022-08-18 PROCEDURE — 99291 CRITICAL CARE FIRST HOUR: CPT | Performed by: EMERGENCY MEDICINE

## 2022-08-18 PROCEDURE — 96372 THER/PROPH/DIAG INJ SC/IM: CPT | Mod: XS

## 2022-08-18 PROCEDURE — 96375 TX/PRO/DX INJ NEW DRUG ADDON: CPT

## 2022-08-18 PROCEDURE — 96374 THER/PROPH/DIAG INJ IV PUSH: CPT

## 2022-08-18 RX ORDER — DIPHENHYDRAMINE HYDROCHLORIDE 50 MG/ML
INJECTION INTRAMUSCULAR; INTRAVENOUS
Status: COMPLETED
Start: 2022-08-18 | End: 2022-08-18

## 2022-08-18 RX ORDER — DEXAMETHASONE SODIUM PHOSPHATE 10 MG/ML
10 INJECTION, SOLUTION INTRAMUSCULAR; INTRAVENOUS ONCE
Status: COMPLETED | OUTPATIENT
Start: 2022-08-18 | End: 2022-08-18

## 2022-08-18 RX ORDER — EPINEPHRINE 0.3 MG/.3ML
0.3 INJECTION SUBCUTANEOUS
Qty: 2 EACH | Refills: 0 | Status: SHIPPED | OUTPATIENT
Start: 2022-08-18

## 2022-08-18 RX ORDER — PREDNISONE 20 MG/1
TABLET ORAL
Qty: 10 TABLET | Refills: 0 | Status: SHIPPED | OUTPATIENT
Start: 2022-08-18 | End: 2023-10-02

## 2022-08-18 RX ORDER — DIPHENHYDRAMINE HYDROCHLORIDE 50 MG/ML
50 INJECTION INTRAMUSCULAR; INTRAVENOUS ONCE
Status: COMPLETED | OUTPATIENT
Start: 2022-08-18 | End: 2022-08-18

## 2022-08-18 RX ADMIN — FAMOTIDINE 20 MG: 10 INJECTION, SOLUTION INTRAVENOUS at 19:54

## 2022-08-18 RX ADMIN — DIPHENHYDRAMINE HYDROCHLORIDE 50 MG: 50 INJECTION INTRAMUSCULAR; INTRAVENOUS at 19:56

## 2022-08-18 RX ADMIN — DEXAMETHASONE SODIUM PHOSPHATE 10 MG: 10 INJECTION, SOLUTION INTRAMUSCULAR; INTRAVENOUS at 19:55

## 2022-08-18 RX ADMIN — DIPHENHYDRAMINE HYDROCHLORIDE 50 MG: 50 INJECTION, SOLUTION INTRAMUSCULAR; INTRAVENOUS at 19:56

## 2022-08-18 RX ADMIN — EPINEPHRINE 0.4 MG: 1 INJECTION INTRAMUSCULAR; INTRAVENOUS; SUBCUTANEOUS at 19:52

## 2022-08-18 RX ADMIN — SODIUM CHLORIDE 1000 ML: 9 INJECTION, SOLUTION INTRAVENOUS at 19:58

## 2022-08-18 ASSESSMENT — ENCOUNTER SYMPTOMS
SHORTNESS OF BREATH: 1
FEVER: 0
CHILLS: 0

## 2022-08-18 ASSESSMENT — ACTIVITIES OF DAILY LIVING (ADL): ADLS_ACUITY_SCORE: 35

## 2022-08-19 ENCOUNTER — HOSPITAL ENCOUNTER (EMERGENCY)
Facility: HOSPITAL | Age: 37
Discharge: HOME | End: 2022-08-19
Attending: EMERGENCY MEDICINE | Admitting: EMERGENCY MEDICINE
Payer: COMMERCIAL

## 2022-08-19 VITALS
DIASTOLIC BLOOD PRESSURE: 80 MMHG | TEMPERATURE: 97.9 F | HEART RATE: 78 BPM | RESPIRATION RATE: 16 BRPM | OXYGEN SATURATION: 98 % | SYSTOLIC BLOOD PRESSURE: 140 MMHG

## 2022-08-19 DIAGNOSIS — T78.40XD ALLERGIC REACTION, SUBSEQUENT ENCOUNTER: ICD-10-CM

## 2022-08-19 PROCEDURE — 250N000011 HC RX IP 250 OP 636: Performed by: EMERGENCY MEDICINE

## 2022-08-19 PROCEDURE — 96374 THER/PROPH/DIAG INJ IV PUSH: CPT

## 2022-08-19 PROCEDURE — 96372 THER/PROPH/DIAG INJ SC/IM: CPT | Mod: XS

## 2022-08-19 PROCEDURE — 250N000009 HC RX 250: Performed by: EMERGENCY MEDICINE

## 2022-08-19 PROCEDURE — 99284 EMERGENCY DEPT VISIT MOD MDM: CPT | Performed by: EMERGENCY MEDICINE

## 2022-08-19 PROCEDURE — 96375 TX/PRO/DX INJ NEW DRUG ADDON: CPT

## 2022-08-19 PROCEDURE — 258N000003 HC RX IP 258 OP 636: Performed by: EMERGENCY MEDICINE

## 2022-08-19 PROCEDURE — 99284 EMERGENCY DEPT VISIT MOD MDM: CPT | Mod: 25

## 2022-08-19 RX ORDER — METHYLPREDNISOLONE SODIUM SUCCINATE 125 MG/2ML
125 INJECTION, POWDER, LYOPHILIZED, FOR SOLUTION INTRAMUSCULAR; INTRAVENOUS
Status: COMPLETED | OUTPATIENT
Start: 2022-08-19 | End: 2022-08-19

## 2022-08-19 RX ADMIN — METHYLPREDNISOLONE SODIUM SUCCINATE 125 MG: 125 INJECTION, POWDER, FOR SOLUTION INTRAMUSCULAR; INTRAVENOUS at 10:11

## 2022-08-19 RX ADMIN — EPINEPHRINE 0.3 MG: 1 INJECTION INTRAMUSCULAR; INTRAVENOUS; SUBCUTANEOUS at 10:10

## 2022-08-19 RX ADMIN — SODIUM CHLORIDE 500 ML: 9 INJECTION, SOLUTION INTRAVENOUS at 10:11

## 2022-08-19 RX ADMIN — FAMOTIDINE 20 MG: 10 INJECTION, SOLUTION INTRAVENOUS at 10:11

## 2022-08-19 ASSESSMENT — ENCOUNTER SYMPTOMS
CONSTITUTIONAL NEGATIVE: 1
ENDOCRINE NEGATIVE: 1
MUSCULOSKELETAL NEGATIVE: 1
ROS SKIN COMMENTS: PLEASE SEE HISTORY OF CHIEF COMPLAINT
RESPIRATORY NEGATIVE: 1
GASTROINTESTINAL NEGATIVE: 1
CARDIOVASCULAR NEGATIVE: 1
NEUROLOGICAL NEGATIVE: 1
EYES NEGATIVE: 1

## 2022-08-19 ASSESSMENT — ACTIVITIES OF DAILY LIVING (ADL): ADLS_ACUITY_SCORE: 35

## 2022-08-19 NOTE — ED PROVIDER NOTES
History     Chief Complaint   Patient presents with     Allergic Reaction     HPI  Ej Gustafson is a 37 year old male who is here with allergic reaction.  Suspects he was stung by a bee prior to arrival.  Short of breath.  Diaphoretic.  Diffuse rash noted.  Took no meds prior to arrival.  Stomach feels upset but has not vomited.  No diarrhea.  No sensation of throat closing or swelling in oropharynx.    Allergies:  No Known Allergies    Problem List:    Patient Active Problem List    Diagnosis Date Noted     Chewing tobacco nicotine dependence with nicotine-induced disorder 02/10/2017     Priority: Medium        Past Medical History:    Past Medical History:   Diagnosis Date     Tobacco abuse        Past Surgical History:    Past Surgical History:   Procedure Laterality Date     NO HISTORY OF SURGERY         Family History:    Family History   Problem Relation Age of Onset     Family History Negative Mother      Family History Negative Sister      Family History Negative Brother        Social History:  Marital Status:   [2]  Social History     Tobacco Use     Smoking status: Former Smoker     Years: 10.00     Types: Dip, chew, snus or snuff     Smokeless tobacco: Current User     Types: Chew     Tobacco comment: 2 cans/week- denies quit plan 9/9/21   Substance Use Topics     Alcohol use: Yes     Alcohol/week: 0.0 standard drinks     Comment: 1 weekend/month     Drug use: No        Medications:    nabumetone (RELAFEN) 750 MG tablet  predniSONE (DELTASONE) 20 MG tablet          Review of Systems   Constitutional: Negative for chills and fever.   Respiratory: Positive for shortness of breath.    Cardiovascular: Negative for chest pain.   All other systems reviewed and are negative.      Physical Exam   BP: 96/64  Pulse: (!) 46  Temp: 97  F (36.1  C)  Resp: 20  SpO2: 97 %      Physical Exam  Constitutional:       General: He is not in acute distress.     Appearance: Normal appearance. He is diaphoretic.   HENT:       Head: Normocephalic and atraumatic.      Right Ear: External ear normal.      Left Ear: External ear normal.      Nose: Nose normal. No rhinorrhea.   Eyes:      Conjunctiva/sclera: Conjunctivae normal.   Cardiovascular:      Rate and Rhythm: Normal rate and regular rhythm.      Pulses: Normal pulses.   Pulmonary:      Effort: Pulmonary effort is normal. No respiratory distress.      Breath sounds: Normal breath sounds. No stridor. No wheezing.   Abdominal:      General: There is no distension.      Palpations: Abdomen is soft.      Tenderness: There is no abdominal tenderness.   Musculoskeletal:         General: No deformity or signs of injury.   Skin:     General: Skin is warm.      Capillary Refill: Capillary refill takes less than 2 seconds.      Findings: Rash present.      Comments: Diffuse hives   Neurological:      General: No focal deficit present.      Mental Status: He is alert. Mental status is at baseline.   Psychiatric:         Mood and Affect: Mood normal.         Behavior: Behavior normal.         ED Course                 Procedures             Critical Care time:  was 31 minutes for this patient excluding procedures.             No results found for this or any previous visit (from the past 24 hour(s)).    Medications   famotidine (PEPCID) injection 20 mg (20 mg Intravenous Given 8/18/22 1954)   0.9% sodium chloride BOLUS (1,000 mLs Intravenous New Bag 8/18/22 1958)   EPINEPHrine (ADRENALIN) kit 0.4 mg (0.4 mg Intramuscular Given 8/18/22 1952)   dexamethasone PF (DECADRON) injection 10 mg (10 mg Intravenous Given 8/18/22 1955)   diphenhydrAMINE (BENADRYL) injection 50 mg (50 mg Intravenous Given 8/18/22 1956)       Assessments & Plan (with Medical Decision Making)     I have reviewed the nursing notes.    I have reviewed the findings, diagnosis, plan and need for follow up with the patient.  Critical Care Addendum    My initial assessment, based on my review of nursing observations, review of  vital signs, focused history, physical exam and review of cardiac rhythm monitor, established that Ej Gustafson has respiratory insufficiency, which requires immediate intervention, and therefore he is critically ill.     After the initial assessment, the care team initiated IV fluid administration and initiated medication therapy with epinephrine, decadron, diphenhydramine, famotidine to provide stabilization care. Due to the critical nature of this patient, I reassessed nursing observations, vital signs, physical exam, review of cardiac rhythm monitor, mental status, neurologic status and respiratory status multiple times prior to his disposition.     Time also spent performing documentation and coordination of care.     Critical care time (excluding teaching time and procedures): 31 minutes.     New Prescriptions    No medications on file     37-year-old male here with probable anaphylaxis.  Will give epinephrine, Decadron, diphenhydramine, famotidine, fluids, reassess.  Does not need intubation as he has no stridor.  Airway protected.  Will monitor for 4 hours and if unremarkable, will send home with prednisone and heavy autoinjector.    Final diagnoses:   Anaphylaxis, initial encounter         8/18/2022   [facility]     Dario Dial MD  08/18/22 2013

## 2022-08-19 NOTE — ED NOTES
Pt's raised rash on back completely disappeared. Rash on extremities now flattened and disappearing as well. Pt reports feeling well. Vitals stable at this time.

## 2022-08-19 NOTE — ED PROVIDER NOTES
History     Chief Complaint   Patient presents with     Rash     HPI  Ej Gustafson is a 37 year old male who was evaluated and treated in our emergency department last night for an anaphylactic reaction to a bee sting.  He was at work today and he began developing hives and itching again.  He states he has some mild tightness in his throat as well.  He denies headache or dizziness.  He has not had nausea or vomiting.  He was transported to us by EMS.  Patient took 50 mg of Benadryl orally prior to coming.  He relates that he was not able to get his prescriptions filled last night as it was quite late.  He was prescribed prednisone and an EpiPen.    Allergies:  Allergies   Allergen Reactions     Bee Venom Anaphylaxis       Problem List:    Patient Active Problem List    Diagnosis Date Noted     Chewing tobacco nicotine dependence with nicotine-induced disorder 02/10/2017     Priority: Medium        Past Medical History:    Past Medical History:   Diagnosis Date     Tobacco abuse        Past Surgical History:    Past Surgical History:   Procedure Laterality Date     NO HISTORY OF SURGERY         Family History:    Family History   Problem Relation Age of Onset     Family History Negative Mother      Family History Negative Sister      Family History Negative Brother        Social History:  Marital Status:   [2]  Social History     Tobacco Use     Smoking status: Former Smoker     Years: 10.00     Types: Dip, chew, snus or snuff     Smokeless tobacco: Current User     Types: Chew     Tobacco comment: 2 cans/week- denies quit plan 9/9/21   Substance Use Topics     Alcohol use: Yes     Alcohol/week: 0.0 standard drinks     Comment: 1 weekend/month     Drug use: No        Medications:    EPINEPHrine (ANY BX GENERIC EQUIV) 0.3 MG/0.3ML injection 2-pack  predniSONE (DELTASONE) 20 MG tablet          Review of Systems   Constitutional: Negative.    HENT: Negative.    Eyes: Negative.    Respiratory: Negative.     Cardiovascular: Negative.    Gastrointestinal: Negative.    Endocrine: Negative.    Genitourinary: Negative.    Musculoskeletal: Negative.    Skin:        Please see history of chief complaint   Neurological: Negative.    All other systems reviewed found    Physical Exam   BP: 146/88  Pulse: 67  Temp: 97.5  F (36.4  C)  Resp: 16  SpO2: 97 %      Physical Exam 37-year-old gentleman who is awake alert oriented person place and time very pleasant and cooperative with my exam.  HEENT normocephalic extraocular muscles intact and pupils are equally round and reactive to light.  Tongue midline palate intact oropharynx is clear no swelling of the soft palate no swelling of the tongue.  Neck is supple full range of motion without pain.  Lungs are clear bilaterally.  Heart maintains regular rate and rhythm S1 and S2 sounds are appreciated.  Abdomen is soft and nontender.  Extremes of range of motion no edema brisk capillary refill.  Neurologic exam no focal deficit.  Dermatologic exam patient has diffuse urticaria mainly on his trunk.    ED Course              ED Course as of 08/19/22 1100   Fri Aug 19, 2022   1057 Patient was treated with EpiPen and IV medications.  He responded very well.  His urticaria essentially cleared up.  He relates that he will be able to get his prescriptions filled today.  He is strongly encouraged to do so.  I will advise him to be reassessed by his primary care provider as soon as possible but also urged him to return immediately if further problems should occur.                         No results found for this or any previous visit (from the past 24 hour(s)).    Medications   EPINEPHrine (ADRENALIN) kit 0.3 mg (0.3 mg Intramuscular Given 8/19/22 1010)   famotidine (PEPCID) injection 20 mg (20 mg Intravenous Given 8/19/22 1011)   methylPREDNISolone sodium succinate (solu-MEDROL) injection 125 mg (125 mg Intravenous Given 8/19/22 1011)   0.9% sodium chloride BOLUS (0 mLs Intravenous Stopped  8/19/22 1044)       Assessments & Plan (with Medical Decision Making)     I have reviewed the nursing notes.    I have reviewed the findings, diagnosis, plan and need for follow up with the patient.  The plan is to discharge the patient with appropriate discharge and follow up instructions.    New Prescriptions    No medications on file       Final diagnoses:   Allergic reaction, subsequent encounter       8/19/2022   [facility]     Rahul Downing,   08/19/22 5499

## 2022-08-19 NOTE — ED NOTES
Pt given Epinephrine 0.3 mg IM in right deltoid, Solumedrol 125 mg IV and Pepcid 20 mg IV at this time. Pt had taken Benadryl 50 mg PO prior to his arrival. Pt reports feeling like his heart is racing. Currently Hr 64 and NSR. Remaining vitals stable. Sats 100% on room air. Afebrile. Receiving 500 ml fluid bolus.

## 2022-08-19 NOTE — ED TRIAGE NOTES
Patient presents with complaints of an allergic reaction. States he was golfing and got stung by something that he thought was a bee. States he felty like everything was starting to swell. He has swelling around his lips and eyes, states he feels like his throat is closing and he can't see.

## 2022-08-19 NOTE — ED NOTES
States he is feeling much better. Denies any SOB. Discharge instructions reviewed. Verbalized understanding. Ambulated out of ER independently to awaiting private vehicle driven by employer.

## 2022-08-19 NOTE — ED TRIAGE NOTES
Broke out in hives at work, tingling in throat. Had taken 50mg of benadryl prior to EMS arriving on scene

## 2022-08-19 NOTE — ED NOTES
Patient provided written and verbal discharge instructions and patient verbalized understanding. Patient advised prescriptions were e-scribed to [facility] for  tomorrow. Patient also advised to call 911 or return to ED for new or worsening symptoms until he can  EPI pen prescription and to seek care if he does use EPI pen and patient verbalizes understanding. Patient left department ambulatory.

## 2022-09-04 ENCOUNTER — HEALTH MAINTENANCE LETTER (OUTPATIENT)
Age: 37
End: 2022-09-04

## 2023-01-09 ENCOUNTER — TRANSFERRED RECORDS (OUTPATIENT)
Dept: HEALTH INFORMATION MANAGEMENT | Facility: CLINIC | Age: 38
End: 2023-01-09

## 2023-02-25 ENCOUNTER — TRANSFERRED RECORDS (OUTPATIENT)
Dept: HEALTH INFORMATION MANAGEMENT | Facility: CLINIC | Age: 38
End: 2023-02-25

## 2023-06-03 ENCOUNTER — HEALTH MAINTENANCE LETTER (OUTPATIENT)
Age: 38
End: 2023-06-03

## 2023-07-13 ENCOUNTER — TELEPHONE (OUTPATIENT)
Dept: DERMATOLOGY | Facility: OTHER | Age: 38
End: 2023-07-13

## 2023-07-13 NOTE — TELEPHONE ENCOUNTER
LVM for pt to call back and schedule with Dr. Phillip in Dermatology. [facility]  [facility] Auth: [id number]

## 2023-10-02 ENCOUNTER — OFFICE VISIT (OUTPATIENT)
Dept: DERMATOLOGY | Facility: OTHER | Age: 38
End: 2023-10-02
Attending: DERMATOLOGY
Payer: COMMERCIAL

## 2023-10-02 VITALS
BODY MASS INDEX: 26.79 KG/M2 | OXYGEN SATURATION: 97 % | HEART RATE: 63 BPM | WEIGHT: 189.38 LBS | DIASTOLIC BLOOD PRESSURE: 70 MMHG | SYSTOLIC BLOOD PRESSURE: 126 MMHG

## 2023-10-02 DIAGNOSIS — L73.9 FOLLICULITIS: Primary | ICD-10-CM

## 2023-10-02 PROCEDURE — 99203 OFFICE O/P NEW LOW 30 MIN: CPT | Performed by: DERMATOLOGY

## 2023-10-02 RX ORDER — DOXYCYCLINE 100 MG/1
CAPSULE ORAL
Qty: 60 CAPSULE | Refills: 6 | Status: SHIPPED | OUTPATIENT
Start: 2023-10-02

## 2023-10-02 ASSESSMENT — PAIN SCALES - GENERAL: PAINLEVEL: NO PAIN (0)

## 2023-10-02 NOTE — PROGRESS NOTES
Subjective: Ej states that he has been having nasal sores for months or maybe years. [facility] be the best [facility] for him to be evaluated.  He wonders why he was not referred to ENT.  He apparently has been diagnosed as having sinusitis as well.  He is an [place] .      Additionally he experiences some papular lesions on the body that seem to come and go.  He apparently is  and does see his kids from time to time.  He is not aware that any of his children or associates have the similar nasal problem.  He also has a lesion on his left ear that he thinks might be related to skin cancer.  There apparently is a family history of skin cancer    Objective: I am I am unable to look into his nose very well.  The nasal wall appears to be slightly reddened but normal to me.  Externally there is nothing on his nose of concern.  He showed us a few small moles here and there on his trunk and extremities but these were very nonspecific and look harmless.    Assessment: .  I am not sure what is going on inside his nose.  Possibly some type of a mucositis.     Plan: I would like him to see Dr. You for a general assessment of his nose and sinuses.  I will place referral for him to see her when she is able to do that.    In the meantime I have prescribed doxycycline empirically to be taken twice daily.  He states that he has taken this before for this problem apparently did well on it but it was discontinued.  Advised that this should be a safe trial to see if this will reduce the nasal lesions until he is evaluated by ENT.    Additionally he did have a small slit like lesion on the top  of the left ear.  It look like a fissure and not like a true actinic keratosis or a cancer.   He does wear earplugs but not earmuffs.  He is very young to have an actinic in that location.  I suggest that he use hydrocortisone cream on this for several weeks to see  if that would help.  If this advances to a growth then he will need a biopsy.    Return in 3 months to see if and how the doxycycline has worked.    20 minutes spent with physical exam, history taking, chart review and counseling.

## 2023-10-02 NOTE — LETTER
10/2/2023       RE: Ej Gustafson  [address]  [place] [address]     Dear Colleague,    Thank you for referring your patient, Ej Gustafson, to the [facility]. Please see a copy of my visit note below.    Subjective: Ej states that he has been having nasal sores for months or maybe years. [facility] be the best place for him to be evaluated.  He wonders why he was not referred to ENT.  He apparently has been diagnosed as having sinusitis as well.  He is an [place] .      Additionally he experiences some papular lesions on the body that seem to come and go.  He apparently is  and does see his kids from time to time.  He is not aware that any of his children or associates have the similar nasal problem.  He also has a lesion on his left ear that he thinks might be related to skin cancer.  There apparently is a family history of skin cancer    Objective: I am I am unable to look into his nose very well.  The nasal wall appears to be slightly reddened but normal to me.  Externally there is nothing on his nose of concern.  He showed us a few small moles here and there on his trunk and extremities but these were very nonspecific and look harmless.    Assessment: .  I am not sure what is going on inside his nose.  Possibly some type of a mucositis.     Plan: I would like him to see Dr. You for a general assessment of his nose and sinuses.  I will place referral for him to see her when she is able to do that.    In the meantime I have prescribed doxycycline empirically to be taken twice daily.  He states that he has taken this before for this problem apparently did well on it but it was discontinued.  Advised that this should be a safe trial to see if this will reduce the nasal lesions until he is evaluated by ENT.    Additionally he did have a small  slit like lesion on the top  of the left ear.  It look like a fissure and not like a true actinic keratosis or a cancer.   He does wear earplugs but not earmuffs.  He is very young to have an actinic in that location.  I suggest that he use hydrocortisone cream on this for several weeks to see if that would help.  If this advances to a growth then he will need a biopsy.    Return in 3 months to see if and how the doxycycline has worked.    20 minutes spent with physical exam, history taking, chart review and counseling.     Again, thank you for allowing me to participate in the care of your patient.      Sincerely,    CARSON Phillip MD

## 2024-07-06 ENCOUNTER — HEALTH MAINTENANCE LETTER (OUTPATIENT)
Age: 39
End: 2024-07-06

## 2024-08-16 ENCOUNTER — ANCILLARY PROCEDURE (OUTPATIENT)
Dept: GENERAL RADIOLOGY | Facility: OTHER | Age: 39
End: 2024-08-16
Attending: PHYSICAL MEDICINE & REHABILITATION
Payer: OTHER GOVERNMENT

## 2024-08-16 DIAGNOSIS — M19.90 ARTHRITIS: ICD-10-CM

## 2024-08-16 PROCEDURE — 73630 X-RAY EXAM OF FOOT: CPT | Mod: TC | Performed by: STUDENT IN AN ORGANIZED HEALTH CARE EDUCATION/TRAINING PROGRAM

## 2025-02-24 ENCOUNTER — OFFICE VISIT (OUTPATIENT)
Dept: OTOLARYNGOLOGY | Facility: OTHER | Age: 40
End: 2025-02-24
Attending: OTOLARYNGOLOGY
Payer: COMMERCIAL

## 2025-02-24 VITALS
DIASTOLIC BLOOD PRESSURE: 64 MMHG | BODY MASS INDEX: 25.9 KG/M2 | SYSTOLIC BLOOD PRESSURE: 98 MMHG | HEIGHT: 71 IN | WEIGHT: 185 LBS | RESPIRATION RATE: 18 BRPM | HEART RATE: 51 BPM | TEMPERATURE: 97.5 F | OXYGEN SATURATION: 98 %

## 2025-02-24 DIAGNOSIS — F17.229 CHEWING TOBACCO NICOTINE DEPENDENCE WITH NICOTINE-INDUCED DISORDER: ICD-10-CM

## 2025-02-24 DIAGNOSIS — J34.829 NASAL VALVE COLLAPSE: ICD-10-CM

## 2025-02-24 DIAGNOSIS — J34.2 DNS (DEVIATED NASAL SEPTUM): Primary | ICD-10-CM

## 2025-02-24 PROCEDURE — 31231 NASAL ENDOSCOPY DX: CPT | Performed by: OTOLARYNGOLOGY

## 2025-02-24 PROCEDURE — 99203 OFFICE O/P NEW LOW 30 MIN: CPT | Mod: 25 | Performed by: OTOLARYNGOLOGY

## 2025-02-24 ASSESSMENT — PAIN SCALES - GENERAL: PAINLEVEL_OUTOF10: MILD PAIN (1)

## 2025-02-24 NOTE — PATIENT INSTRUCTIONS
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  This may be with causing your irritation but there is nothing to biopsy or remove.  Use Ayr gel at least twice daily for nasal dryness  Work on quitting chewing tobacco    Thank you for allowing Dr. You and our ENT team to participate in your care.  If your medications are too expensive, please give the nurse a call.  We can possibly change this medication.  If you have a scheduling or an appointment question please contact our Health Unit Coordinator at their direct line [phone].   ALL nursing questions or concerns can be directed to your ENT nurse, Maciel, at: [phone]

## 2025-02-24 NOTE — PROGRESS NOTES
Otolaryngology Consultation    Patient: Ej Gustafson  : 1985    Patient presents with:  Ear Problem: Lesion in right nares// Marcelina Carter NP Referring      HPI:  Ej Gustafson is a 39 year old male seen today for Evaluation of nasal sores.    He has noted a bump inside the right nares, present x 5 years.  He points to the medial alar cartilage.  More bothersome x 3 years.  It is irritating.  Bled once after manipulation.    He has over 3 episodes of maxillary and facial pressure, worsening congestion, post nasal drainage per year x 5 years.  Treated supportively with OTCs.  No chronic congestion.    CT [facility] in distant past, not in PACS    SNOT 63    He notes chronic sinusitis.     This has been present for years he has been seen at local [facility] dermatology and Dr. Phillip on 10/2/23    He was started on doxycycline and was to follow-up with ENT.  No change on doxycycline    Has tried mupirocin without improvement    No chronic intranasal medications, rhinorrhea or nasal picking.    10-year history of chewing tobacco use 2 cans/week      Sino-Nasal Outcome Test (SNOT - 22)    1. Need to Blow Nose: (Patient-Rptd) (P) Moderate;Severe;Bad as it can be  2. Nasal Blockage: (Patient-Rptd) (P) Severe  3. Sneezing: (Patient-Rptd) (P) Moderate;Severe  4. Runny Nose: (Patient-Rptd) (P) Moderate;Severe;Bad as it can be  5. Cough: (Patient-Rptd) (P) Moderate  6. Post-nasal discharge: (Patient-Rptd) (P) Moderate;Severe  7. Thick nasal discharge: (Patient-Rptd) (P) Moderate;Severe  8. Ear fullness: (Patient-Rptd) (P) Mild or slight;Moderate  9. Dizziness: (Patient-Rptd) (P) Very mild;Mild or slight  10. Ear Pain: (Patient-Rptd) (P) Very mild;Mild or slight  11. Facial pain/pressure: (Patient-Rptd) (P) Moderate;Severe;Bad as it can be  12. Decreased Sense of Smell/Taste: (Patient-Rptd) (P) Severe  13. Difficulty falling asleep: (Patient-Rptd) (P) Severe  14. Wake up at night: (Patient-Rptd) (P) Severe  15. Lack of a good night's  "sleep: (Patient-Rptd) (P) Severe  16. Wake up tired: (Patient-Rptd) (P) Severe  17. Fatigue: (Patient-Rptd) (P) Moderate;Severe  18. Reduced Productivity: (Patient-Rptd) (P) Moderate;Severe  19. Reduced Concentration: (Patient-Rptd) (P) Moderate;Severe  20. Frustrated/restless/irritable: (Patient-Rptd) (P) Moderate;Severe  21. Sad: (Patient-Rptd) (P) Mild or slight;Moderate  22. Embarrassed: (Patient-Rptd) (P) None    Total Score: (Patient-Rptd) (P) 63    COPYRIGHT 1996. [facility]      Current Outpatient Rx   Medication Sig Dispense Refill    EPINEPHrine (ANY BX GENERIC EQUIV) 0.3 MG/0.3ML injection 2-pack Inject 0.3 mLs (0.3 mg) into the muscle once as needed for anaphylaxis May repeat one time in 5-15 minutes if response to initial dose is inadequate. 2 each 0       Allergies: Bee venom     Past Medical History:   Diagnosis Date    Tobacco abuse     chewing tobacco       Past Surgical History:   Procedure Laterality Date    NO HISTORY OF SURGERY         ENT family history reviewed    Social History     Tobacco Use    Smoking status: Every Day     Types: Dip, chew, snus or snuff    Smokeless tobacco: Current     Types: Chew    Tobacco comments:     2 cans/week- denies quit plan 9/9/21   Substance Use Topics    Alcohol use: Yes     Alcohol/week: 0.0 standard drinks of alcohol     Comment: 1 weekend/month    Drug use: No       Review of Systems  ROS: 10 point ROS neg other than the symptoms noted above in the HPI and occasional sneezing itchy eyes, ear plugging, headaches, GERD, diarrhea    Physical Exam  BP 98/64 (BP Location: Left arm, Patient Position: Sitting, Cuff Size: Adult Large)   Pulse 51   Temp 97.5  F (36.4  C) (Tympanic)   Resp 18   Ht 1.791 m (5' 10.5\")   Wt 83.9 kg (185 lb)   SpO2 98%   BMI 26.17 kg/m    General - The patient is well nourished and well developed, and appears to have good nutritional status.  Alert and oriented to person and place, answers " questions and cooperates with examination appropriately.   Head and Face - Normocephalic and atraumatic, with no gross asymmetry noted.  The facial nerve is intact, with strong symmetric movements.  Voice and Breathing - The patient was breathing comfortably without the use of accessory muscles. There was no wheezing, stridor, or stertor.  The patients voice was clear and strong, and had appropriate pitch and quality.  No rakan peripheral digital clubbing or cyanosis   Ears -The external auditory canals are patent, the tympanic membranes are intact without effusion, retraction or mass.  Bony landmarks are intact.  Eyes - Extraocular movements intact, and the pupils were reactive to light.  Sclera were not icteric or injected, conjunctiva were pink and moist.  Mouth - Examination of the oral cavity showed pink, healthy oral mucosa. No lesions or ulcerations noted.  The tongue was mobile and midline, and the dentition were in good condition.  Cobblestoning with tobacco use changes to the midline lower lip, photos in epic no ulceration or mass  Throat - The walls of the oropharynx were smooth, pink, moist, symmetric, and had no lesions or ulcerations.  The tonsillar pillars and soft palate were symmetric.  The uvula was midline on elevation.    Neck - No palpable enlarged fixed cervical lymph nodes.  No neck cysts or unusual tenderness to palpation.   No palpable fixed thyroid nodules or concerning goiter.  The trachea is grossly midline.   Nose - External contour is slightly asymmetric at the nasal tip, no deflection or scars.  Nasal mucosa is pink and moist with no abnormal mucus.  The septum and turbinates were evaluated.  No polyps, masses, or purulence noted on examination.  I used a double ball and headlight and do not see any intranasal lesions or ulcerations.  There is internal nasal valve collapse on the right and the lateral alar cartilage is prevalent as well as the caudal superior septal deviation which  corresponds to the area of his concern.  Normal overlying mucosa    To evaluate the nose and sinuses, I performed rigid nasal endoscopy.  I applied topical nasal lidocaine and neosynephrine.    I began with the LEFT side using a 0 degree rigid nasal endoscope, and then similarly examined the RIGHT side    Findings:  Inferior turbinates:  3+  Caudal DNS right, INV right, post right septal spur with contact  Middle turbinate and middle meatus:  No purulence, no polyposis,  Superior meatus was evaluated  Frontal recess clear  Mucosa is healthy throughout,  no polyps nor polypoid degeneration  Sphenoethmoidal recess without purulence   Nasopharynx clear  The patient tolerated the procedure well        Impression and Plan- Ej Gustafson is a 39 year old male with:    ICD-10-CM    1. DNS (deviated nasal septum)  J34.2       2. Nasal valve collapse  J34.829       3. Chewing tobacco nicotine dependence with nicotine-induced disorder  F17.229             Reassured.  There is no nasal mass or lesion.  He has slightly asymmetric nasal anatomy and this seems to correspond to what he is feeling in the right nares.  I told Ej if he develops crusting or any visible lesion to take a picture and submit it to us and I would be happy to see him back.    Tobacco cessation was strongly encouraged.  The associated risk of head and neck cancer was discussed.  Every year of cessation offers health benefits. This was discussed with the patient today and they voiced understanding.  Quit tools and a nicotine patch were offered.  He is not interested in quitting chewing at this juncture.      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  This may be with causing your irritation but there is nothing to biopsy or remove.  Use Ayr gel at least twice daily for nasal dryness  Work on quitting chewing  tobacco    Valentina You D.O.  Otolaryngology/Head and Neck Surgery  Allergy

## 2025-05-29 ENCOUNTER — OFFICE VISIT (OUTPATIENT)
Dept: CHIROPRACTIC MEDICINE | Facility: OTHER | Age: 40
End: 2025-05-29
Attending: CHIROPRACTOR
Payer: COMMERCIAL

## 2025-05-29 DIAGNOSIS — M99.03 SEGMENTAL AND SOMATIC DYSFUNCTION OF LUMBAR REGION: Primary | ICD-10-CM

## 2025-05-29 DIAGNOSIS — M54.50 ACUTE BILATERAL LOW BACK PAIN WITHOUT SCIATICA: ICD-10-CM

## 2025-05-29 DIAGNOSIS — M99.02 SEGMENTAL AND SOMATIC DYSFUNCTION OF THORACIC REGION: ICD-10-CM

## 2025-05-29 PROCEDURE — 99202 OFFICE O/P NEW SF 15 MIN: CPT | Mod: 25 | Performed by: CHIROPRACTOR

## 2025-05-29 PROCEDURE — 98940 CHIROPRACT MANJ 1-2 REGIONS: CPT | Mod: AT | Performed by: CHIROPRACTOR

## 2025-06-09 NOTE — PROGRESS NOTES
Subjective Finding:    Chief compalint: Patient presents with:  Back Pain , Pain Scale: 8/10, Intensity: sharp, Duration: 2 days, Radiating: bilateral buttock.    Date of injury:     Activities that the pain restricts:   Home/household/hobbies/social activities: Yes.  Work duties: Yes.  Sleep: No.  Makes symptoms better: rest.  Makes symptoms worse: lumbar extension and lumbar flexion.  Have you seen anyone else for the symptoms? No.  Work related: No.  Automobile related injury: No.    Objective and Assessment:    Posture Analysis:   High shoulder: .  Head tilt: .  High iliac crest: .  Head carriage: neutral.  Thoracic Kyphosis: neutral.  Lumbar Lordosis: forward.    Lumbar Range of Motion: flexion decreased and extension decreased.  Cervical Range of Motion: .  Thoracic Range of Motion: .  Extremity Range of Motion: .    Palpation:   Quad lumb: bilateral, referred pain: no    Segmental dysfunction pre-treatment and treatment area: T5, T6, L4, and L5.    Assessment post-treatment:  Cervical: .  Thoracic: ROM increased.  Lumbar: ROM increased.    Comments: .      Complicating Factors: .    Procedure(s):  CMT:  84486 Chiropractic manipulative treatment 1-2 regions performed   Thoracic: Diversified, See above for level, Prone and Lumbar: Diversified, See above for level, Side posture    Modalities:  None performed this visit    Therapeutic procedures:  None    Plan:  Treatment plan: PRN.  Instructed patient: stretch as instructed at visit.  Short term goals: increase ROM.  Long term goals: increase ADL.  Prognosis: very good.

## 2025-06-16 ENCOUNTER — OFFICE VISIT (OUTPATIENT)
Dept: CHIROPRACTIC MEDICINE | Facility: OTHER | Age: 40
End: 2025-06-16
Attending: CHIROPRACTOR
Payer: COMMERCIAL

## 2025-06-16 DIAGNOSIS — M54.50 ACUTE BILATERAL LOW BACK PAIN WITHOUT SCIATICA: ICD-10-CM

## 2025-06-16 DIAGNOSIS — M99.03 SEGMENTAL AND SOMATIC DYSFUNCTION OF LUMBAR REGION: Primary | ICD-10-CM

## 2025-06-16 DIAGNOSIS — M99.02 SEGMENTAL AND SOMATIC DYSFUNCTION OF THORACIC REGION: ICD-10-CM

## 2025-06-19 NOTE — PROGRESS NOTES
Subjective Finding:    Chief compalint: Patient presents with:  Back Pain: Low back pain   , Pain Scale: 3/10, Intensity: dull, Duration: 3 weeks, Radiating:  bilateral buttock.    Date of injury:     Activities that the pain restricts:   Home/household/hobbies/social activities: Yes.  Work duties: No.  Sleep: No.  Makes symptoms better: rest.  Makes symptoms worse: lumbar flexion.  Have you seen anyone else for the symptoms? No.  Work related: No.  Automobile related injury: No.    Objective and Assessment:    Posture Analysis:   High shoulder: .  Head tilt: .  High iliac crest: .  Head carriage: neutral.  Thoracic Kyphosis: neutral.  Lumbar Lordosis: forward.    Lumbar Range of Motion: extension decreased.  Cervical Range of Motion: .  Thoracic Range of Motion: .  Extremity Range of Motion: .    Palpation:   Quad lumb: bilateral, referred pain: no    Segmental dysfunction pre-treatment and treatment area: T6, L4, and L5.    Assessment post-treatment:  Cervical: .  Thoracic: ROM increased.  Lumbar: ROM increased.    Comments: .      Complicating Factors: .    Procedure(s):  CMT:  08184 Chiropractic manipulative treatment 1-2 regions performed   Thoracic: Diversified, See above for level, Prone and Lumbar: Diversified, See above for level, Side posture    Modalities:  None performed this visit    Therapeutic procedures:  None    Plan:  Treatment plan: PRN.  Instructed patient: stretch as instructed at visit.  Short term goals: reduce muscle spasm.  Long term goals: .  Prognosis: very good.

## 2025-07-13 ENCOUNTER — HEALTH MAINTENANCE LETTER (OUTPATIENT)
Age: 40
End: 2025-07-13

## (undated) RX ORDER — GINSENG 100 MG
CAPSULE ORAL
Status: DISPENSED
Start: 2025-02-24

## (undated) RX ORDER — LIDOCAINE HYDROCHLORIDE AND EPINEPHRINE 10; 10 MG/ML; UG/ML
INJECTION, SOLUTION INFILTRATION; PERINEURAL
Status: DISPENSED
Start: 2025-02-24